# Patient Record
Sex: MALE | Race: WHITE | NOT HISPANIC OR LATINO | Employment: STUDENT | URBAN - METROPOLITAN AREA
[De-identification: names, ages, dates, MRNs, and addresses within clinical notes are randomized per-mention and may not be internally consistent; named-entity substitution may affect disease eponyms.]

---

## 2020-07-31 ENCOUNTER — TRANSCRIBE ORDERS (OUTPATIENT)
Dept: LAB | Facility: HOSPITAL | Age: 20
End: 2020-07-31

## 2020-07-31 ENCOUNTER — APPOINTMENT (OUTPATIENT)
Dept: LAB | Facility: HOSPITAL | Age: 20
End: 2020-07-31
Payer: COMMERCIAL

## 2020-07-31 DIAGNOSIS — R76.8 FALSE POSITIVE SEROLOGICAL TEST FOR SYPHILIS: ICD-10-CM

## 2020-07-31 DIAGNOSIS — R76.8 FALSE POSITIVE SEROLOGICAL TEST FOR SYPHILIS: Primary | ICD-10-CM

## 2020-07-31 LAB — HBV SURFACE AB SER-ACNC: >1000 MIU/ML

## 2020-07-31 PROCEDURE — 36415 COLL VENOUS BLD VENIPUNCTURE: CPT

## 2020-07-31 PROCEDURE — 86706 HEP B SURFACE ANTIBODY: CPT

## 2020-11-17 ENCOUNTER — NURSE TRIAGE (OUTPATIENT)
Dept: OTHER | Facility: OTHER | Age: 20
End: 2020-11-17

## 2020-11-17 DIAGNOSIS — Z20.828 SARS-ASSOCIATED CORONAVIRUS EXPOSURE: Primary | ICD-10-CM

## 2020-11-23 DIAGNOSIS — Z20.828 SARS-ASSOCIATED CORONAVIRUS EXPOSURE: ICD-10-CM

## 2020-11-23 PROCEDURE — U0003 INFECTIOUS AGENT DETECTION BY NUCLEIC ACID (DNA OR RNA); SEVERE ACUTE RESPIRATORY SYNDROME CORONAVIRUS 2 (SARS-COV-2) (CORONAVIRUS DISEASE [COVID-19]), AMPLIFIED PROBE TECHNIQUE, MAKING USE OF HIGH THROUGHPUT TECHNOLOGIES AS DESCRIBED BY CMS-2020-01-R: HCPCS | Performed by: FAMILY MEDICINE

## 2020-11-24 LAB — SARS-COV-2 RNA SPEC QL NAA+PROBE: NOT DETECTED

## 2021-01-03 ENCOUNTER — IMMUNIZATIONS (OUTPATIENT)
Dept: FAMILY MEDICINE CLINIC | Facility: HOSPITAL | Age: 21
End: 2021-01-03

## 2021-01-03 DIAGNOSIS — Z23 ENCOUNTER FOR IMMUNIZATION: ICD-10-CM

## 2021-01-03 PROCEDURE — 0011A SARS-COV-2 / COVID-19 MRNA VACCINE (MODERNA) 100 MCG: CPT

## 2021-01-03 PROCEDURE — 91301 SARS-COV-2 / COVID-19 MRNA VACCINE (MODERNA) 100 MCG: CPT

## 2021-01-30 ENCOUNTER — IMMUNIZATIONS (OUTPATIENT)
Dept: FAMILY MEDICINE CLINIC | Facility: HOSPITAL | Age: 21
End: 2021-01-30

## 2021-01-30 DIAGNOSIS — Z23 ENCOUNTER FOR IMMUNIZATION: Primary | ICD-10-CM

## 2021-01-30 PROCEDURE — 91301 SARS-COV-2 / COVID-19 MRNA VACCINE (MODERNA) 100 MCG: CPT

## 2021-01-30 PROCEDURE — 0012A SARS-COV-2 / COVID-19 MRNA VACCINE (MODERNA) 100 MCG: CPT

## 2021-12-11 ENCOUNTER — IMMUNIZATIONS (OUTPATIENT)
Dept: FAMILY MEDICINE CLINIC | Facility: HOSPITAL | Age: 21
End: 2021-12-11

## 2021-12-11 DIAGNOSIS — Z23 ENCOUNTER FOR IMMUNIZATION: Primary | ICD-10-CM

## 2021-12-11 PROCEDURE — 0001A COVID-19 PFIZER VACC 0.3 ML: CPT

## 2021-12-11 PROCEDURE — 91300 COVID-19 PFIZER VACC 0.3 ML: CPT

## 2023-05-09 ENCOUNTER — TELEPHONE (OUTPATIENT)
Dept: HEMATOLOGY ONCOLOGY | Facility: CLINIC | Age: 23
End: 2023-05-09

## 2023-05-09 NOTE — TELEPHONE ENCOUNTER
Quirino on  requesting patient to fax over most recent labs, imaging, and records regarding his DX  Requested they be faxed to [phone]  Provided him with [phone] if he has questions/concers

## 2023-05-09 NOTE — TELEPHONE ENCOUNTER
Returned call to patient regarding medical records, informed patient to bring whatever records he does have that way we can get them uploaded into his chart  Left my direct teams number for patient if he needs to call back for any questions

## 2023-05-09 NOTE — TELEPHONE ENCOUNTER
Patient Call    Who are you speaking with? Patient    If it is not the patient, are they listed on an active communication consent form? N/A   What is the reason for this call? Pt called because he contacted his pcp from [place] and they cannot fax labs  He has the CT disc and impression and lab results  He would like to know if he can drop it off today or bring it to his appt   Does this require a call back? Yes   If a call back is required, please list best call back number [phone]   If a call back is required, advise that a message will be forwarded to their care team and someone will return their call as soon as possible  Did you relay this information to the patient?  Yes

## 2023-05-11 ENCOUNTER — CONSULT (OUTPATIENT)
Dept: HEMATOLOGY ONCOLOGY | Facility: CLINIC | Age: 23
End: 2023-05-11

## 2023-05-11 ENCOUNTER — APPOINTMENT (OUTPATIENT)
Dept: LAB | Facility: CLINIC | Age: 23
End: 2023-05-11

## 2023-05-11 VITALS
RESPIRATION RATE: 17 BRPM | WEIGHT: 230 LBS | BODY MASS INDEX: 34.86 KG/M2 | HEIGHT: 68 IN | SYSTOLIC BLOOD PRESSURE: 122 MMHG | DIASTOLIC BLOOD PRESSURE: 76 MMHG

## 2023-05-11 DIAGNOSIS — R59.0 LYMPHADENOPATHY, MESENTERIC: ICD-10-CM

## 2023-05-11 DIAGNOSIS — R59.0 LYMPHADENOPATHY, MESENTERIC: Primary | ICD-10-CM

## 2023-05-11 DIAGNOSIS — R07.9 CHEST PAIN, UNSPECIFIED TYPE: ICD-10-CM

## 2023-05-11 DIAGNOSIS — R06.02 SHORTNESS OF BREATH: ICD-10-CM

## 2023-05-11 LAB
ALBUMIN SERPL BCP-MCNC: 4 G/DL (ref 3.5–5)
ALP SERPL-CCNC: 70 U/L (ref 46–116)
ALT SERPL W P-5'-P-CCNC: 26 U/L (ref 12–78)
ANION GAP SERPL CALCULATED.3IONS-SCNC: 0 MMOL/L (ref 4–13)
AST SERPL W P-5'-P-CCNC: 23 U/L (ref 5–45)
BASOPHILS # BLD AUTO: 0.06 THOUSANDS/ÂΜL (ref 0–0.1)
BASOPHILS NFR BLD AUTO: 1 % (ref 0–1)
BILIRUB SERPL-MCNC: 0.63 MG/DL (ref 0.2–1)
BUN SERPL-MCNC: 12 MG/DL (ref 5–25)
CALCIUM SERPL-MCNC: 8.7 MG/DL (ref 8.3–10.1)
CHLORIDE SERPL-SCNC: 109 MMOL/L (ref 96–108)
CO2 SERPL-SCNC: 28 MMOL/L (ref 21–32)
CREAT SERPL-MCNC: 0.92 MG/DL (ref 0.6–1.3)
CRP SERPL QL: 27.1 MG/L
EOSINOPHIL # BLD AUTO: 0.67 THOUSAND/ÂΜL (ref 0–0.61)
EOSINOPHIL NFR BLD AUTO: 10 % (ref 0–6)
ERYTHROCYTE [DISTWIDTH] IN BLOOD BY AUTOMATED COUNT: 14.1 % (ref 11.6–15.1)
GFR SERPL CREATININE-BSD FRML MDRD: 116 ML/MIN/1.73SQ M
GLUCOSE SERPL-MCNC: 89 MG/DL (ref 65–140)
HAV IGM SER QL: NORMAL
HBV CORE IGM SER QL: NORMAL
HBV SURFACE AG SER QL: NORMAL
HCT VFR BLD AUTO: 34.2 % (ref 36.5–49.3)
HCV AB SER QL: NORMAL
HGB BLD-MCNC: 11.8 G/DL (ref 12–17)
HIV 1+2 AB+HIV1 P24 AG SERPL QL IA: NORMAL
HIV 2 AB SERPL QL IA: NORMAL
HIV1 AB SERPL QL IA: NORMAL
HIV1 P24 AG SERPL QL IA: NORMAL
IMM GRANULOCYTES # BLD AUTO: 0.02 THOUSAND/UL (ref 0–0.2)
IMM GRANULOCYTES NFR BLD AUTO: 0 % (ref 0–2)
LDH SERPL-CCNC: 186 U/L (ref 81–234)
LYMPHOCYTES # BLD AUTO: 1.41 THOUSANDS/ÂΜL (ref 0.6–4.47)
LYMPHOCYTES NFR BLD AUTO: 20 % (ref 14–44)
MCH RBC QN AUTO: 28.4 PG (ref 26.8–34.3)
MCHC RBC AUTO-ENTMCNC: 34.5 G/DL (ref 31.4–37.4)
MCV RBC AUTO: 82 FL (ref 82–98)
MONOCYTES # BLD AUTO: 0.46 THOUSAND/ÂΜL (ref 0.17–1.22)
MONOCYTES NFR BLD AUTO: 7 % (ref 4–12)
NEUTROPHILS # BLD AUTO: 4.42 THOUSANDS/ÂΜL (ref 1.85–7.62)
NEUTS SEG NFR BLD AUTO: 62 % (ref 43–75)
NRBC BLD AUTO-RTO: 0 /100 WBCS
PLATELET # BLD AUTO: 315 THOUSANDS/UL (ref 149–390)
PMV BLD AUTO: 10.8 FL (ref 8.9–12.7)
POTASSIUM SERPL-SCNC: 3.4 MMOL/L (ref 3.5–5.3)
PROT SERPL-MCNC: 7.8 G/DL (ref 6.4–8.4)
RBC # BLD AUTO: 4.15 MILLION/UL (ref 3.88–5.62)
SODIUM SERPL-SCNC: 137 MMOL/L (ref 135–147)
WBC # BLD AUTO: 7.04 THOUSAND/UL (ref 4.31–10.16)

## 2023-05-11 NOTE — PROGRESS NOTES
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  On 4/23, he went to [place] to visit family, at some burgers and that night started developing RUQ pain radiating to his lower back  The pain worsened through the night, patient presented to a local hospital       A CT chest/abdomen/pelvis was obtained, which showed mild splenomegaly, small pleural effusion, nonspecific enlarged mesenteric lymph node within the LUQ of the abdomen measuring up to 1 1 cm  Increased number of mesenteric lymph nodes  An abdominal ultrasound was obtained 4/26 due to continued RUQ pain, which showed a slightly contracted gallbladder, no gallbladder abnormality was seen  Patient also experiencing intense mid chest pain a few days ago, which lasts about 5 hours  He is having increased shortness of breath when going up a couple of flights of stairs  Patient denies increased fatigue, fevers, frequent infections, drenching night sweats, unintentional weight loss, decreased appetite  He is continuing to have the RUQ pain  Most recent labs 4/25/23: WBC 6 3, Hgb 12 3, MCV 81, Platelets 336, Creatinine 0 9, eGFR 122, serum iron 112, % saturation 27, ferritin 29 2, Vitamin B12 360  Patient does not smoke cigarettes, denies illicit drug use  Drinks socially  Family history -  maternal grandmother had non-hodgkin's lymphoma and breast cancer  ROS: As stated in history of present illness otherwise her 14 point review of systems today was negative      ECOG PS: 0       Test Results:    Imaging: CT outside images    Result Date: 5/11/2023  Narrative: 1 2 840 [id number] 2 55 3 [id number] 240      Labs: No results found for: WBC, HGB, HCT, MCV, PLT  No results found for: NA, K, CL, CO2, ANIONGAP, BUN, CREATININE, GLUCOSE, GLUF, CALCIUM, CORRECTEDCA, AST, ALT, ALKPHOS, PROT, BILITOT, EGFR    Social History:   Social History     Socioeconomic History   • Marital status: Single     Spouse name: Not on file   • Number of children: Not on file   • Years of education: Not on file   • Highest education level: Not on file   Occupational History   • Not on file   Tobacco Use   • Smoking status: Not on file   • Smokeless tobacco: Not on file   Substance and Sexual Activity   • Alcohol use: Not on file   • Drug use: Not on file   • Sexual activity: Not on file   Other Topics Concern   • Not on file   Social History Narrative   • Not on file     Social Determinants of Health     Financial Resource Strain: Not on file   Food Insecurity: Not on file   Transportation Needs: Not on file   Physical Activity: Not on file   Stress: Not on file   Social Connections: Not on file   Intimate Partner Violence: Not on file   Housing Stability: Not on file       Current Medications:   No current outpatient medications on file  No current facility-administered medications for this visit  Allergies: Allergies   Allergen Reactions   • Augmentin [Amoxicillin-Pot Clavulanate] Rash         Physical Exam:    Body surface area is 2 17 meters squared  Wt Readings from Last 3 Encounters:   05/11/23 104 kg (230 lb)        Temp Readings from Last 3 Encounters:   No data found for Temp        BP Readings from Last 3 Encounters:   05/11/23 122/76         Pulse Readings from Last 3 Encounters:   No data found for Pulse     @LASTSAO2(3)@    Physical Exam     Constitutional   General appearance: No acute distress, well appearing and well nourished  Eyes   Conjunctiva and lids: No swelling, erythema or discharge  Pupils and irises: Equal, round and reactive to light      Ears, Nose, Mouth, and Throat   External inspection of ears and nose: Normal     Nasal mucosa, septum, and turbinates: Normal without edema or erythema  Oropharynx: Normal with no erythema, edema, exudate or lesions  Pulmonary   Respiratory effort: No increased work of breathing or signs of respiratory distress  Auscultation of lungs: Clear to auscultation  Cardiovascular   Palpation of heart: Normal PMI, no thrills  Auscultation of heart: Normal rate and rhythm, normal S1 and S2, without murmurs  Examination of extremities for edema and/or varicosities: Normal     Carotid pulses: Normal     Abdomen   Abdomen: Non-tender, no masses  Liver and spleen: No hepatomegaly or splenomegaly  Lymphatic   Palpation of lymph nodes in neck: No lymphadenopathy  Musculoskeletal   Gait and station: Normal     Digits and nails: Normal without clubbing or cyanosis  Inspection/palpation of joints, bones, and muscles: Normal     Skin   Skin and subcutaneous tissue: Normal without rashes or lesions  Neurologic   Cranial nerves: Cranial nerves 2-12 intact  Sensation: No sensory loss  Psychiatric   Orientation to person, place, and time: Normal     Mood and affect: Normal           Assessment/ Plan:  Reviewed that this may be inflammatory vs viral vs malignancy  We will begin with some blood work CBC, CMP, CRP, EBV, mononucleosis screen, LDH, Hepatitis Panel, HIV Panel, flow cytometry and PET/CT  For patient's chest pain and increased SOB, we will obtain Echo  Recommend patient take oral iron and vitamin b12 supplements  We will see patient back in the office a week after PET/CT  Patient agreeable to plan  He is aware to contact us should he have any further questions/concerns  I spent 60 minutes in chart review, face to face counseling, coordination of care, and documentation

## 2023-05-12 LAB
EBV NA IGG SER IA-ACNC: <18 U/ML (ref 0–17.9)
EBV VCA IGG SER IA-ACNC: <18 U/ML (ref 0–17.9)
EBV VCA IGM SER IA-ACNC: <36 U/ML (ref 0–35.9)
HETEROPH AB SER QL: NEGATIVE
INTERPRETATION: NORMAL
SCAN RESULT: NORMAL

## 2023-05-18 ENCOUNTER — HOSPITAL ENCOUNTER (OUTPATIENT)
Dept: NON INVASIVE DIAGNOSTICS | Facility: CLINIC | Age: 23
Discharge: HOME | End: 2023-05-18

## 2023-05-18 VITALS
HEIGHT: 68 IN | DIASTOLIC BLOOD PRESSURE: 76 MMHG | HEART RATE: 90 BPM | WEIGHT: 230 LBS | BODY MASS INDEX: 34.86 KG/M2 | SYSTOLIC BLOOD PRESSURE: 122 MMHG

## 2023-05-18 DIAGNOSIS — R06.02 SHORTNESS OF BREATH: ICD-10-CM

## 2023-05-18 DIAGNOSIS — R07.9 CHEST PAIN, UNSPECIFIED TYPE: ICD-10-CM

## 2023-05-18 LAB
AORTIC ROOT: 2.7 CM
APICAL FOUR CHAMBER EJECTION FRACTION: 59 %
E WAVE DECELERATION TIME: 153 MS
FRACTIONAL SHORTENING: 33 % (ref 28–44)
INTERVENTRICULAR SEPTUM IN DIASTOLE (PARASTERNAL SHORT AXIS VIEW): 0.9 CM
INTERVENTRICULAR SEPTUM: 0.9 CM (ref 0.6–1.1)
LAAS-AP2: 13.9 CM2
LEFT ATRIUM AREA SYSTOLE SINGLE PLANE A4C: 14.8 CM2
LEFT ATRIUM SIZE: 3.6 CM
LEFT INTERNAL DIMENSION IN SYSTOLE: 3.1 CM (ref 2.1–4)
LEFT VENTRICULAR INTERNAL DIMENSION IN DIASTOLE: 4.6 CM (ref 3.5–6)
LEFT VENTRICULAR POSTERIOR WALL IN END DIASTOLE: 0.9 CM
LEFT VENTRICULAR STROKE VOLUME: 58 ML
LVSV (TEICH): 58 ML
MV E'TISSUE VEL-SEP: 13 CM/S
MV PEAK A VEL: 0.75 M/S
MV PEAK E VEL: 97 CM/S
MV STENOSIS PRESSURE HALF TIME: 44 MS
MV VALVE AREA P 1/2 METHOD: 5 CM2
RIGHT ATRIUM AREA SYSTOLE A4C: 12.2 CM2
RIGHT VENTRICLE ID DIMENSION: 3.8 CM
SL CV LEFT ATRIUM LENGTH A2C: 4.7 CM
SL CV LV EF: 60
SL CV PED ECHO LEFT VENTRICLE DIASTOLIC VOLUME (MOD BIPLANE) 2D: 97 ML
SL CV PED ECHO LEFT VENTRICLE SYSTOLIC VOLUME (MOD BIPLANE) 2D: 39 ML
TRICUSPID ANNULAR PLANE SYSTOLIC EXCURSION: 2 CM

## 2023-05-25 ENCOUNTER — HOSPITAL ENCOUNTER (OUTPATIENT)
Dept: RADIOLOGY | Age: 23
Discharge: HOME | End: 2023-05-25

## 2023-05-25 DIAGNOSIS — R59.0 LYMPHADENOPATHY, MESENTERIC: ICD-10-CM

## 2023-05-25 LAB — GLUCOSE SERPL-MCNC: 93 MG/DL (ref 65–140)

## 2023-05-30 ENCOUNTER — TELEPHONE (OUTPATIENT)
Dept: HEMATOLOGY ONCOLOGY | Facility: CLINIC | Age: 23
End: 2023-05-30

## 2023-05-30 DIAGNOSIS — J90 PLEURAL EFFUSION: Primary | ICD-10-CM

## 2023-05-30 NOTE — TELEPHONE ENCOUNTER
Informed patient his PET/CT revealed the enlarged mesenteric LN likely inflammatory  There was mild uptake  There was also a moderate right pleural effusion, informed patient I will put in a referral to Pulmonology to further evaluate  I will also move his appt from Dr Kwan Mensah schedule to my schedule on 6/6  Patient agreeable

## 2023-06-06 ENCOUNTER — OFFICE VISIT (OUTPATIENT)
Dept: HEMATOLOGY ONCOLOGY | Facility: CLINIC | Age: 23
End: 2023-06-06
Payer: COMMERCIAL

## 2023-06-06 VITALS
TEMPERATURE: 97.8 F | WEIGHT: 229 LBS | OXYGEN SATURATION: 97 % | RESPIRATION RATE: 16 BRPM | SYSTOLIC BLOOD PRESSURE: 120 MMHG | HEART RATE: 85 BPM | HEIGHT: 68 IN | BODY MASS INDEX: 34.71 KG/M2 | DIASTOLIC BLOOD PRESSURE: 74 MMHG

## 2023-06-06 DIAGNOSIS — R59.0 LYMPHADENOPATHY, MESENTERIC: ICD-10-CM

## 2023-06-06 DIAGNOSIS — D64.9 ANEMIA, UNSPECIFIED TYPE: Primary | ICD-10-CM

## 2023-06-06 PROCEDURE — 99214 OFFICE O/P EST MOD 30 MIN: CPT

## 2023-06-06 NOTE — PROGRESS NOTES
HEMATOLOGY / [address] FOLLOW UP NOTE    Primary Care Provider: Tate Wood  Referring Provider:    MRN: [id number]  : 2000    Reason for Encounter:  Mesenteric Lymphadenopathy         Interval History:  Patient presents for a follow up of his mesenteric lymphadenopathy  Patient experiencing pain right side back pain  He denies SOB  Denies fevers, drenching nights sweats, decreased appetite  Denies abdominal pain, diarrhea, constipation  Labs: , CRP 27 1, EBV panel negative, Hepatitis Panel negative, HIV negative, Hgb 11 8, MCV 82, WBC 7 04, Platelets 354014  PET/CT 23:  1  Mesenteric nodes do not demonstrate significant FDG activity, and may just be reactive  Consider CT follow-up in 6 months to demonstrate stability  2  Minimal improvement in right middle infiltrate/atelectasis  Increased size of moderate right pleural effusion  This may be inflammatory  Correlate clinically  Follow-up to resolution recommended  3  Stable mild splenomegaly but without significant FDG uptake  This may also be reassessed on follow-up CT  4  Otherwise there are no hypermetabolic lesions that are concerning for malignancy/metastases  Patient was informed of the CT scan results  and referral for pulmonology was placed  Advised at that time to follow up with them regarding the pleural effusion  HPI: HPI: Manish Anderson was seen for initial consultation 2023 regarding mesenteric lymphadenopathy  Patient has no significant PMH  He has ALBERTINA and uses a CPAP, which he reports he didn't clean that often  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  On , he went to [place] to visit family[facility] and [facility] started developing RUQ pain radiating to his lower back   The pain worsened through the night, patient presented to a local hospital      A CT chest/abdomen/pelvis was obtained, which showed mild splenomegaly, small pleural effusion, nonspecific enlarged mesenteric lymph node within the LUQ of the abdomen measuring up to 1 1 cm  Increased number of mesenteric lymph nodes  An abdominal ultrasound was obtained 4/26 due to continued RUQ pain, which showed a slightly contracted gallbladder, no gallbladder abnormality was seen  Patient also experiencing intense mid chest pain a few days ago, which lasts about 5 hours  He is having increased shortness of breath when going up a couple of flights of stairs  Patient denies increased fatigue, fevers, frequent infections, drenching night sweats, unintentional weight loss, decreased appetite  He is continuing to have the RUQ pain  REVIEW OF SYSTEMS:  Please note that a 14-point review of systems was performed to include Constitutional, HEENT, Respiratory, CVS, GI, , Musculoskeletal, Integumentary, Neurologic, Rheumatologic, Endocrinologic, Psychiatric, Lymphatic, and Hematologic/Oncologic systems were reviewed and are negative unless otherwise stated in HPI  Positive and negative findings pertinent to this evaluation are incorporated into the history of present illness  ECOG PS: 0    PROBLEM LIST:  There is no problem list on file for this patient  Assessment / Plan:  Patient has not followed up with Pulmonology, strongly encouraged patient to follow up as his back pain likely related to pleural effusion  We will repeat CT scan chest abdomen pelvis w contrast in 6 months  Will repeat CBCD, CMP prior to scan  We will see patient back in 6 months  He is encouraged to call the office with worsening symptoms or if he has any additional questions/concerns  I spent 30 minutes on chart review, face to face counseling time, coordination of care and documentation  Past Medical History:   has no past medical history on file  PAST SURGICAL HISTORY:   has no past surgical history on file  CURRENT MEDICATIONS  No current outpatient medications on file       No current facility-administered medications for this "visit  [unfilled]    SOCIAL HISTORY:   reports that he has never smoked  He has never been exposed to tobacco smoke  He has never used smokeless tobacco  He reports current alcohol use of about 1 0 standard drink of alcohol per week  He reports that he does not use drugs  FAMILY HISTORY:  family history includes Breast cancer (age of onset: 79) in his maternal grandmother; Lymphoma (age of onset: 79) in his maternal grandmother  ALLERGIES:  is allergic to augmentin [amoxicillin-pot clavulanate]  Physical Exam:  Vital Signs:   Visit Vitals  /74 (BP Location: Left arm, Patient Position: Sitting, Cuff Size: Standard)   Pulse 85   Temp 97 8 °F (36 6 °C) (Temporal)   Resp 16   Ht 5' 8\" (1 727 m)   Wt 104 kg (229 lb)   SpO2 97%   BMI 34 82 kg/m²   Smoking Status Never   BSA 2 17 m²     Body mass index is 34 82 kg/m²  Body surface area is 2 17 meters squared  GEN: Alert, awake oriented x3, in no acute distress  HEENT- No pallor, icterus, cyanosis, no oral mucosal lesions,   LAD - no palpable cervical, clavicle, axillary, inguinal LAD  Heart- normal S1 S2, regular rate and rhythm, No murmur, rubs     Lungs- clear breathing sound bilateral    Abdomen- soft, Non tender, bowel sounds present  Extremities- No cyanosis, clubbing, edema  Neuro- No focal neurological deficit    Labs:  Lab Results   Component Value Date    HCT 34 2 (L) 05/11/2023    HGB 11 8 (L) 05/11/2023    MCV 82 05/11/2023     05/11/2023    WBC 7 04 05/11/2023     Lab Results   Component Value Date    AGAP 0 (L) 05/11/2023    ALKPHOS 70 05/11/2023    ALT 26 05/11/2023    AST 23 05/11/2023    BUN 12 05/11/2023    CALCIUM 8 7 05/11/2023     (H) 05/11/2023    CO2 28 05/11/2023    CREATININE 0 92 05/11/2023    EGFR 116 05/11/2023    GLUC 89 05/11/2023    K 3 4 (L) 05/11/2023    SODIUM 137 05/11/2023    TBILI 0 63 05/11/2023    TP 7 8 05/11/2023     "

## 2023-06-08 ENCOUNTER — TELEPHONE (OUTPATIENT)
Dept: PULMONOLOGY | Facility: CLINIC | Age: 23
End: 2023-06-08

## 2023-06-08 NOTE — TELEPHONE ENCOUNTER
Returned patient's call and left message in regards to scheduling a consult appointment, per referral placed by Hematologist, at our [place] office

## 2023-06-12 ENCOUNTER — CONSULT (OUTPATIENT)
Dept: PULMONOLOGY | Facility: CLINIC | Age: 23
End: 2023-06-12
Payer: COMMERCIAL

## 2023-06-12 ENCOUNTER — HOSPITAL ENCOUNTER (OUTPATIENT)
Dept: RADIOLOGY | Facility: HOSPITAL | Age: 23
Discharge: HOME | End: 2023-06-12
Attending: INTERNAL MEDICINE
Payer: COMMERCIAL

## 2023-06-12 ENCOUNTER — PREP FOR PROCEDURE (OUTPATIENT)
Dept: PULMONOLOGY | Facility: CLINIC | Age: 23
End: 2023-06-12

## 2023-06-12 ENCOUNTER — APPOINTMENT (OUTPATIENT)
Dept: LAB | Facility: CLINIC | Age: 23
End: 2023-06-12
Payer: COMMERCIAL

## 2023-06-12 VITALS
HEART RATE: 98 BPM | TEMPERATURE: 99 F | HEIGHT: 68 IN | SYSTOLIC BLOOD PRESSURE: 116 MMHG | WEIGHT: 237.9 LBS | BODY MASS INDEX: 36.06 KG/M2 | OXYGEN SATURATION: 98 % | DIASTOLIC BLOOD PRESSURE: 78 MMHG

## 2023-06-12 DIAGNOSIS — R16.1 SPLENOMEGALY: ICD-10-CM

## 2023-06-12 DIAGNOSIS — J90 PLEURAL EFFUSION: ICD-10-CM

## 2023-06-12 DIAGNOSIS — D64.9 ANEMIA, UNSPECIFIED TYPE: ICD-10-CM

## 2023-06-12 DIAGNOSIS — J90 PLEURAL EFFUSION: Primary | ICD-10-CM

## 2023-06-12 DIAGNOSIS — G47.33 OBSTRUCTIVE SLEEP APNEA ON CPAP: ICD-10-CM

## 2023-06-12 DIAGNOSIS — Z99.89 OBSTRUCTIVE SLEEP APNEA ON CPAP: ICD-10-CM

## 2023-06-12 DIAGNOSIS — J18.9 ATYPICAL PNEUMONIA: ICD-10-CM

## 2023-06-12 LAB
ANA SER QL IA: NEGATIVE
ERYTHROCYTE [SEDIMENTATION RATE] IN BLOOD: 25 MM/HOUR (ref 0–14)
FERRITIN SERPL-MCNC: 19 NG/ML (ref 24–336)

## 2023-06-12 PROCEDURE — 83540 ASSAY OF IRON: CPT

## 2023-06-12 PROCEDURE — 86430 RHEUMATOID FACTOR TEST QUAL: CPT

## 2023-06-12 PROCEDURE — 86140 C-REACTIVE PROTEIN: CPT

## 2023-06-12 PROCEDURE — 85652 RBC SED RATE AUTOMATED: CPT

## 2023-06-12 PROCEDURE — 99204 OFFICE O/P NEW MOD 45 MIN: CPT | Performed by: INTERNAL MEDICINE

## 2023-06-12 PROCEDURE — 36415 COLL VENOUS BLD VENIPUNCTURE: CPT

## 2023-06-12 PROCEDURE — 83550 IRON BINDING TEST: CPT

## 2023-06-12 PROCEDURE — 86038 ANTINUCLEAR ANTIBODIES: CPT

## 2023-06-12 PROCEDURE — 82728 ASSAY OF FERRITIN: CPT

## 2023-06-12 PROCEDURE — 71046 X-RAY EXAM CHEST 2 VIEWS: CPT

## 2023-06-12 NOTE — ASSESSMENT & PLAN NOTE
Continue CPAP nightly    Advised him to try nasal pillows in the future with chinstrap and he will discuss with his sleep specialist

## 2023-06-12 NOTE — ASSESSMENT & PLAN NOTE
Mild with tendency to microcytosis/hypochromia although still within the normal range  It is unexpected to have anemia given his young age  I will send iron studies for now

## 2023-06-12 NOTE — ASSESSMENT & PLAN NOTE
Treated with cefepime and cefuroxime, will continue to follow imaging studies in the future after thoracentesis

## 2023-06-12 NOTE — ASSESSMENT & PLAN NOTE
Follow with hematology specially with the mesenteric lymph nodes  No clear etiology  We will check flow cytometry on the pleural fluid

## 2023-06-12 NOTE — PROGRESS NOTES
Consultation - Pulmonary Medicine   Viola Mosquera 23 y o  male MRN: [id number]    Physician Requesting Consult: Janie Werner  Reason for Consult: PLEURAL EFFUSION    Pleural effusion  Right-sided increased in size from March to May, no clear etiology, not sure if there is an infectious process in March that evolved and currently has residual effusion but this is not typical at all  I explained to the Lina Culver that I do not have a diagnosis yet but will need to do thoracentesis therapeutic and diagnostic to allow his lung to expand and to get some work-up with a regular chemistry and cell count with differential and cytology, also will check cultures including AFB and fungal and I will send sample for flow cytometry given the splenomegaly and the mesenteric lymph nodes  We will send patient for chest x-ray before the procedure  Otherwise we will continue to monitor in the future  We will repeat inflammatory markers and basic autoimmune work-up  Obstructive sleep apnea on CPAP  Continue CPAP nightly  Advised him to try nasal pillows in the future with chinstrap and he will discuss with his sleep specialist     Atypical pneumonia  Treated with cefepime and cefuroxime, will continue to follow imaging studies in the future after thoracentesis    Splenomegaly  Follow with hematology specially with the mesenteric lymph nodes  No clear etiology  We will check flow cytometry on the pleural fluid  Anemia  Mild with tendency to microcytosis/hypochromia although still within the normal range  It is unexpected to have anemia given his young age  I will send iron studies for now  Diagnoses and all orders for this visit:    Pleural effusion  -     Ambulatory Referral to Pulmonology  -     XR chest pa & lateral; Future  -     Sedimentation rate, automated; Future  -     C-reactive protein; Future  -     MARCELL Screen w/ Reflex to Titer/Pattern;  Future  -     RF Screen w/ Reflex to Titer; Future    Obstructive sleep apnea on CPAP    Atypical pneumonia    Splenomegaly    Anemia, unspecified type  -     Iron Panel (Includes Ferritin, Iron Sat%, Iron, and TIBC); Future        Patient will have thoracentesis done tomorrow by the pulmonary team at [address] [place] [address]  ______________________________________________________________________    HPI:    Gal Katz is a 23 y o  male who presents for evaluation of pleural effusion  Patient has no significant past medical history although he mentions chronic anemia that he attributes to iron deficiency without work-up  He developed right-sided anterior/parasternal chest pain in February of this year 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, he did not do any work-up or follow-up at that time, eventually the chest pain resolved, he did not have fever or chills or night sweats or shortness of breath at that time  Then in March while he was traveling to [place] he developed severe right-sided lateral/posterior chest pain also did not have significant other symptoms, and due to the severity of the pain he went to the emergency room and had a CT scan that showed small pleural effusion  He had recent follow-up with hematology and had PET scan that showed splenomegaly as well  Patient denies history of autoimmune disease or malignancy  No family history of autoimmune disease except of Hashimoto  He denies weight loss or fever or chills or night sweats or pruritus  Denies other complaints  Currently denies chest pain but still feels some rattling noise in his chest   No significant travel history recently except of camping in [place] when he had a tick bite but that was after the first incident of the chest pain  Patient has obstructive sleep apnea and he is on CPAP 5 cm H2O via facial mask  He follows with a sleep specialist in [place]    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 [employer], never smoked cigarettes, no other recreational "drugs  He had Arsalan Sacks for medical school before and it was negative  Review of Systems:  Review of Systems   Constitutional: Negative  HENT: Negative  Eyes: Negative  Respiratory: Negative  Cardiovascular: Negative  Gastrointestinal: Negative  Endocrine: Negative  Genitourinary: Negative  Musculoskeletal: Negative  Skin: Negative  Allergic/Immunologic: Negative  Neurological: Negative  Hematological: Negative  Psychiatric/Behavioral: Negative  Aside from what is mentioned in the HPI, the review of systems otherwise negative  Current Medications:  No current outpatient medications on file  Historical Information   Past Medical History:   Diagnosis Date   • Anemia 23   • Pleural effusion 23   • Sleep apnea, obstructive 2020    uses Cpap-[employer]     History reviewed  No pertinent surgical history    Social History   Social History     Tobacco Use   Smoking Status Never   • Passive exposure: Never   Smokeless Tobacco Never       Occupational history:  No occupational exposure    Family History:   Family History   Problem Relation Age of Onset   • Lymphoma Maternal Grandmother 79        non hodgkins   • Breast cancer Maternal Grandmother 79   • Cancer Maternal Grandmother         Alive, non-Hodgkin's lymphoma, breast cancer   • Diabetes Maternal Grandmother         Alive   • Diabetes Mother         Alive   • Diabetes Father         Alive   • Diabetes Maternal Grandfather                  PhysicalExamination:  Vitals:   /78 (BP Location: Left arm, Patient Position: Sitting, Cuff Size: Large)   Pulse 98   Temp 99 °F (37 2 °C)   Ht 5' 8\" (1 727 m)   Wt 108 kg (237 lb 14 4 oz)   SpO2 98%   BMI 36 17 kg/m²     General: alert, not in acute distress  HEENT: PERRL, no icteric sclera or cyanosis, no thrush, Mallampati 4  Neck: Supple, no lymphadenopathy or thyromegaly, no JVD  Lungs: Decreased breath sounds at the right base, no wheezing " "or crackles  Heart: S1S2 regular, no murmurs or gallops  Abdomen: soft, nontender, bowel sounds present  Extremities: no edema, no clubbing or cyanosis  Neuro: Alert and oriented x 3, no focal neurodeficits   Skin: intact, no rashes      Diagnostic Data:  Labs: I personally reviewed the most recent laboratory data pertinent to today's visit    Lab Results   Component Value Date    HCT 34 2 (L) 05/11/2023    HGB 11 8 (L) 05/11/2023    MCV 82 05/11/2023     05/11/2023    WBC 7 04 05/11/2023     Lab Results   Component Value Date    BUN 12 05/11/2023    CALCIUM 8 7 05/11/2023     (H) 05/11/2023    CO2 28 05/11/2023    CREATININE 0 92 05/11/2023    K 3 4 (L) 05/11/2023     No results found for: \"IGE\"  Lab Results   Component Value Date    ALKPHOS 70 05/11/2023    ALT 26 05/11/2023    AST 23 05/11/2023         Imaging:  I personally reviewed the images on the [facility] system pertinent to today's visit  Chest CT scan from March 2023 reviewed on PACS: In general normal lung parenchyma and no mediastinal lymphadenopathy but there is small area of atelectasis and anterior small pleural-based consolidation in the right middle lobe  Very small right-sided pleural effusion        PET scan from May 2023 reviewed on PACS with the patient in the room: No abnormal hypermetabolic activity, increased and now moderate size right-sided pleural effusion, splenomegaly or at least at the upper size of normal     Other studies:  Cardiogram: LVEF 60%, normal RV      Shmuel Watkins MD  "

## 2023-06-12 NOTE — ASSESSMENT & PLAN NOTE
Right-sided increased in size from March to May, no clear etiology, not sure if there is an infectious process in March that evolved and currently has residual effusion but this is not typical at all  I explained to the Zunilda Delvalle that I do not have a diagnosis yet but will need to do thoracentesis therapeutic and diagnostic to allow his lung to expand and to get some work-up with a regular chemistry and cell count with differential and cytology, also will check cultures including AFB and fungal and I will send sample for flow cytometry given the splenomegaly and the mesenteric lymph nodes  We will send patient for chest x-ray before the procedure  Otherwise we will continue to monitor in the future  We will repeat inflammatory markers and basic autoimmune work-up

## 2023-06-13 ENCOUNTER — HOSPITAL ENCOUNTER (OUTPATIENT)
Dept: RADIOLOGY | Facility: HOSPITAL | Age: 23
Discharge: HOME | End: 2023-06-13
Attending: INTERNAL MEDICINE
Payer: COMMERCIAL

## 2023-06-13 ENCOUNTER — HOSPITAL ENCOUNTER (OUTPATIENT)
Dept: SURGERY | Facility: HOSPITAL | Age: 23
Discharge: HOME | End: 2023-06-13
Attending: INTERNAL MEDICINE | Admitting: INTERNAL MEDICINE
Payer: COMMERCIAL

## 2023-06-13 VITALS
OXYGEN SATURATION: 97 % | TEMPERATURE: 98.5 F | SYSTOLIC BLOOD PRESSURE: 133 MMHG | HEART RATE: 92 BPM | RESPIRATION RATE: 18 BRPM | DIASTOLIC BLOOD PRESSURE: 61 MMHG

## 2023-06-13 VITALS
DIASTOLIC BLOOD PRESSURE: 72 MMHG | HEART RATE: 82 BPM | RESPIRATION RATE: 18 BRPM | OXYGEN SATURATION: 97 % | SYSTOLIC BLOOD PRESSURE: 118 MMHG

## 2023-06-13 DIAGNOSIS — J90 PLEURAL EFFUSION: Primary | ICD-10-CM

## 2023-06-13 DIAGNOSIS — J90 PLEURAL EFFUSION: ICD-10-CM

## 2023-06-13 LAB
BASOPHILS NFR FLD MANUAL: 1 %
CRP SERPL QL: 13 MG/L
EOSINOPHIL NFR FLD MANUAL: 20 %
GLUCOSE FLD-MCNC: 96 MG/DL
IRON SATN MFR SERPL: 8 % (ref 20–50)
IRON SERPL-MCNC: 30 UG/DL (ref 65–175)
LDH FLD L TO P-CCNC: 471 U/L
LYMPHOCYTES NFR BLD AUTO: 55 %
MONOCYTES NFR BLD AUTO: 11 %
NEUTS SEG NFR BLD AUTO: 13 %
PH BODY FLUID: 7.5
PROT FLD-MCNC: 5 G/DL
RHEUMATOID FACT SER QL LA: NEGATIVE
TIBC SERPL-MCNC: 371 UG/DL (ref 250–450)
TOTAL CELLS COUNTED SPEC: 100
WBC # FLD MANUAL: 1955 /UL

## 2023-06-13 PROCEDURE — 87102 FUNGUS ISOLATION CULTURE: CPT | Performed by: INTERNAL MEDICINE

## 2023-06-13 PROCEDURE — 88185 FLOWCYTOMETRY/TC ADD-ON: CPT

## 2023-06-13 PROCEDURE — 82945 GLUCOSE OTHER FLUID: CPT | Performed by: INTERNAL MEDICINE

## 2023-06-13 PROCEDURE — 87205 SMEAR GRAM STAIN: CPT | Performed by: INTERNAL MEDICINE

## 2023-06-13 PROCEDURE — 84157 ASSAY OF PROTEIN OTHER: CPT | Performed by: INTERNAL MEDICINE

## 2023-06-13 PROCEDURE — 88112 CYTOPATH CELL ENHANCE TECH: CPT | Performed by: PATHOLOGY

## 2023-06-13 PROCEDURE — 87070 CULTURE OTHR SPECIMN AEROBIC: CPT | Performed by: INTERNAL MEDICINE

## 2023-06-13 PROCEDURE — 32555 ASPIRATE PLEURA W/ IMAGING: CPT | Performed by: RADIOLOGY

## 2023-06-13 PROCEDURE — 83986 ASSAY PH BODY FLUID NOS: CPT | Performed by: INTERNAL MEDICINE

## 2023-06-13 PROCEDURE — 88305 TISSUE EXAM BY PATHOLOGIST: CPT | Performed by: PATHOLOGY

## 2023-06-13 PROCEDURE — 83615 LACTATE (LD) (LDH) ENZYME: CPT | Performed by: INTERNAL MEDICINE

## 2023-06-13 PROCEDURE — 32555 ASPIRATE PLEURA W/ IMAGING: CPT

## 2023-06-13 PROCEDURE — 87116 MYCOBACTERIA CULTURE: CPT | Performed by: INTERNAL MEDICINE

## 2023-06-13 PROCEDURE — 88184 FLOWCYTOMETRY/ TC 1 MARKER: CPT | Performed by: INTERNAL MEDICINE

## 2023-06-13 PROCEDURE — 89051 BODY FLUID CELL COUNT: CPT | Performed by: INTERNAL MEDICINE

## 2023-06-13 PROCEDURE — 87206 SMEAR FLUORESCENT/ACID STAI: CPT | Performed by: INTERNAL MEDICINE

## 2023-06-13 NOTE — H&P
Pulmonary Consultation   Zay So 23 y o  male MRN: [id number]  2023      Reason for Consultation:  Pleural effusion    Referring: Nikita Sung Md  [facility] [address], [address]:    Pleural effusion    For thoracentesis  US first to ensure adequate window for drainage  UPDATE  Window not amenable for procedure  History of Present Illness   HPI:  Zay So is a 23 y o  male who has a past medical history of ALBERTINA and BEN presents to B for pleural effusion  Seen in clinic yesterday  Referred for thoracentesis    No complaints  Review of Systems   Constitutional: Negative for chills and fever  HENT: Negative for ear pain and sore throat  Eyes: Negative for pain and visual disturbance  Respiratory: Negative for cough and shortness of breath  Cardiovascular: Negative for chest pain and palpitations  Gastrointestinal: Negative for abdominal pain and vomiting  Genitourinary: Negative for dysuria and hematuria  Musculoskeletal: Negative for arthralgias and back pain  Skin: Negative for color change and rash  Neurological: Negative for seizures and syncope  All other systems reviewed and are negative  Historical Information   Past Medical History:   Diagnosis Date   • Anemia 23   • Pleural effusion 23   • Sleep apnea, obstructive 2020    uses [facility]ap[facility]     No past surgical history on file    Family History   Problem Relation Age of Onset   • Lymphoma Maternal Grandmother 79        non hodgkins   • Breast cancer Maternal Grandmother 79   • Cancer Maternal Grandmother         Alive, non-Hodgkin's lymphoma, breast cancer   • Diabetes Maternal Grandmother         Alive   • Diabetes Mother         Alive   • Diabetes Father         Alive   • Diabetes Maternal Grandfather                Occupational History: student    Social History:   Social History     Socioeconomic History   • Marital status: Single Spouse name: Not on file   • Number of children: Not on file   • Years of education: Not on file   • Highest education level: Not on file   Occupational History   • Not on file   Tobacco Use   • Smoking status: Never     Passive exposure: Never   • Smokeless tobacco: Never   Substance and Sexual Activity   • Alcohol use: Yes     Comment: Social use (3-5 drinks/month)   • Drug use: Never   • Sexual activity: Yes     Partners: Female     Birth control/protection: Implant   Other Topics Concern   • Not on file   Social History Narrative   • Not on file     Social Determinants of Health     Financial Resource Strain: Not on file   Food Insecurity: Not on file   Transportation Needs: Not on file   Physical Activity: Not on file   Stress: Not on file   Social Connections: Not on file   Intimate Partner Violence: Not on file   Housing Stability: Not on file        Meds/Allergies   No current outpatient medications on file  Allergies   Allergen Reactions   • Augmentin [Amoxicillin-Pot Clavulanate] Rash       Vitals: There were no vitals taken for this visit  , There is no height or weight on file to calculate BMI  Physical Exam  Physical Exam  Vitals and nursing note reviewed  Constitutional:       General: He is not in acute distress  Appearance: Normal appearance  He is well-developed  He is obese  He is not ill-appearing or toxic-appearing  Interventions: He is not intubated  HENT:      Head: Normocephalic and atraumatic  Right Ear: External ear normal       Left Ear: External ear normal       Nose: Nose normal       Mouth/Throat:      Mouth: Mucous membranes are moist       Pharynx: Oropharynx is clear  Eyes:      General: No scleral icterus  Conjunctiva/sclera: Conjunctivae normal    Cardiovascular:      Rate and Rhythm: Normal rate and regular rhythm  Pulses: Normal pulses  Heart sounds: Normal heart sounds  No murmur heard  No friction rub  No gallop     Pulmonary: "Effort: Pulmonary effort is normal  No tachypnea, accessory muscle usage or respiratory distress  He is not intubated  Breath sounds: Normal air entry  No decreased air movement  Examination of the right-middle field reveals decreased breath sounds  Examination of the right-lower field reveals decreased breath sounds  Decreased breath sounds present  No wheezing, rhonchi or rales  Abdominal:      General: Abdomen is flat  Bowel sounds are normal       Palpations: Abdomen is soft  Musculoskeletal:         General: No swelling or tenderness  Cervical back: Neck supple  No tenderness  Skin:     General: Skin is warm and dry  Neurological:      General: No focal deficit present  Mental Status: He is alert and oriented to person, place, and time  Mental status is at baseline  Labs: I have personally reviewed pertinent lab results  Lab Results   Component Value Date    HCT 34 2 (L) 05/11/2023    HGB 11 8 (L) 05/11/2023    MCV 82 05/11/2023     05/11/2023    WBC 7 04 05/11/2023     Lab Results   Component Value Date    BUN 12 05/11/2023    CALCIUM 8 7 05/11/2023     (H) 05/11/2023    CO2 28 05/11/2023    CREATININE 0 92 05/11/2023    K 3 4 (L) 05/11/2023     No results found for: \"IGE\"  Lab Results   Component Value Date    ALKPHOS 70 05/11/2023    ALT 26 05/11/2023    AST 23 05/11/2023         Imaging and other studies: I have personally reviewed pertinent reports  and I have personally reviewed pertinent films in PACS  PET CT 5/25  CHEST:  Minimal improvement in right middle infiltrate/atelectasis  Increased moderate right pleural effusion with minimal FDG activity, SUV 1 7  This may be inflammatory  Associated right lower atelectasis      No hypermetabolic hilar or mediastinal adenopathy  Pulmonary function testing: none    EKG, Pathology, and Other Studies: I have personally reviewed pertinent reports  Hayley Elena MS  Pulmonary & Critical Care " Medicine Fellow, PGY-V  [address]

## 2023-06-13 NOTE — SEDATION DOCUMENTATION
Right side thoracentesis performed by Dr Kristine Corey  400 ml of clear yellow fluid drained  Ultrasound guided  Labs ordered  Pt tolerated well  Puncture site is CDI and covered with a band aid  AVS reviewed with pt upon dc

## 2023-06-13 NOTE — DISCHARGE INSTRUCTIONS
Thoracentesis   WHAT YOU NEED TO KNOW:   A thoracentesis is a procedure to remove extra fluid or air from between your lungs and your inner chest wall  Air or fluid buildup may make it hard for you to breathe  A thoracentesis allows your lungs to expand fully so you can breathe more easily  DISCHARGE INSTRUCTIONS:     Small amount of shoulder pain and bloody sputum is normal after a Thoracentesis  Rest:  Rest when you feel it is needed  Slowly start to do more each day  Return to your daily activities as directed  Resume your normal diet  Small sips of flat soda will help mild nausea  Do not smoke: If you smoke, it is never too late to quit  Ask for information about how to stop smoking if you need help  Contact Interventional Radiology at [phone] [place] PATIENTS: Contact Interventional Radiology at [phone]) Bill Ospina PATIENTS: Contact Interventional Radiology at [phone]) if:   You have a fever  Your puncture site is red, warm, swollen, or draining pus  You have questions or concerns about your procedure, medicine, or care  Seek care immediately or call 911 if:   Severe chest pain with inspiration and shortness of breath    Large amounts of blood in your sputum    Follow up with your healthcare provider as directed

## 2023-06-13 NOTE — H&P
History and Physical     Name: Kandis Lomeli   Age & Sex: 23 y o  male   MRN: [id number]  Unit/Bed#:    Encounter: [id number]            Assessment:  Right pleural effusion  Recent pneumonia  Splenomegaly with lymphadenopathy    Recommendations:  Plan was to proceed with R thoracentesis, however, after ultrasound evaluation the window for thoracentesis was small  Only one rib space with adequate fluid and there was lung point visible with atelectatic lung  I am requesting this procedure be done by IR with real-time ultrasound guidance for needle entry  Discussed with IR attending Dr Philly Vee  Order placed  Discussed with patient  Diagnostic labs ordered  History of Present Illness   HPI:  Kandis Lomeli is a 23 y o  male with PMHx R pleural effusion, splenomegaly, recent atypical pneumonia, ABLERTINA who presents for R sided thoracentesis  He has no complaints currently    Review of systems:  12 point review of systems was completed and was otherwise negative except as listed in HPI  Historical Information   Past Medical History:   Diagnosis Date   • Anemia 23   • Pleural effusion 23   • Sleep apnea, obstructive 2020    uses [facility]     History reviewed  No pertinent surgical history  Family History   Problem Relation Age of Onset   • Lymphoma Maternal Grandmother 79        non hodgkins   • Breast cancer Maternal Grandmother 79   • Cancer Maternal Grandmother         Alive, non-Hodgkin's lymphoma, breast cancer   • Diabetes Maternal Grandmother         Alive   • Diabetes Mother         Alive   • Diabetes Father         Alive   • Diabetes Maternal Grandfather                  Social History:   Social History     Tobacco Use   Smoking Status Never   • Passive exposure: Never   Smokeless Tobacco Never         Meds/Allergies   No current facility-administered medications for this encounter       (Not in a hospital admission)    Allergies   Allergen Reactions   • Augmentin "[Amoxicillin-Pot Clavulanate] Rash       Vitals: Blood pressure 133/61, pulse 92, temperature 98 5 °F (36 9 °C), temperature source Oral, resp  rate 18, SpO2 97 %  , Room air, There is no height or weight on file to calculate BMI  No intake or output data in the 24 hours ending 06/13/23 0955    Physical Exam  Vitals and nursing note reviewed  Constitutional:       General: He is not in acute distress  Appearance: He is well-developed  HENT:      Head: Normocephalic and atraumatic  Eyes:      Conjunctiva/sclera: Conjunctivae normal    Cardiovascular:      Rate and Rhythm: Normal rate and regular rhythm  Heart sounds: No murmur heard  Pulmonary:      Effort: Pulmonary effort is normal  No respiratory distress  Comments: Diminished at R base  Abdominal:      Palpations: Abdomen is soft  Tenderness: There is no abdominal tenderness  Musculoskeletal:         General: No swelling  Cervical back: Neck supple  Skin:     General: Skin is warm and dry  Capillary Refill: Capillary refill takes less than 2 seconds  Neurological:      Mental Status: He is alert  Psychiatric:         Mood and Affect: Mood normal          Labs: I have personally reviewed pertinent lab results  Laboratory and Diagnostics                            Results from last 7 days   Lab Units 06/12/23  1528   CRP mg/L 13 0*   SED RATE mm/hour 25*   FERRITIN ng/mL 19*                       Microbiology:        ABG: No results found for: \"BEART\", \"LTO0XGZ\", \"O6YWOBEO\", \"KIE0LBT\", \"PHART\", \"PO2ART\", \"SOURCE\"      Imaging and other studies: I have personally reviewed pertinent reports  and I have personally reviewed pertinent films in PACS    CXR 6/12- R sided pleural effusion      Pulmonary function testing: None    EKG, Pathology, and Other Studies: I have personally reviewed pertinent reports              Lali Duarte MD  Attending Physician  Pulmonary and Critical Care Medicine    "

## 2023-06-14 LAB
RHODAMINE-AURAMINE STN SPEC: NORMAL
SCAN RESULT: NORMAL

## 2023-06-15 PROCEDURE — 88112 CYTOPATH CELL ENHANCE TECH: CPT | Performed by: PATHOLOGY

## 2023-06-15 PROCEDURE — 88305 TISSUE EXAM BY PATHOLOGIST: CPT | Performed by: PATHOLOGY

## 2023-06-16 LAB
BACTERIA SPEC BFLD CULT: NO GROWTH
GRAM STN SPEC: NORMAL
GRAM STN SPEC: NORMAL

## 2023-06-19 LAB — FUNGUS SPEC CULT: NORMAL

## 2023-06-20 LAB
MYCOBACTERIUM SPEC CULT: NORMAL
RHODAMINE-AURAMINE STN SPEC: NORMAL

## 2023-06-26 LAB — FUNGUS SPEC CULT: NORMAL

## 2023-06-27 LAB
MYCOBACTERIUM SPEC CULT: NORMAL
RHODAMINE-AURAMINE STN SPEC: NORMAL

## 2023-07-03 LAB — FUNGUS SPEC CULT: NORMAL

## 2023-07-05 LAB
MYCOBACTERIUM SPEC CULT: NORMAL
RHODAMINE-AURAMINE STN SPEC: NORMAL

## 2023-07-10 LAB — FUNGUS SPEC CULT: NORMAL

## 2023-07-11 ENCOUNTER — PATIENT MESSAGE (OUTPATIENT)
Dept: PULMONOLOGY | Facility: CLINIC | Age: 23
End: 2023-07-11

## 2023-07-11 DIAGNOSIS — D64.9 ANEMIA, UNSPECIFIED TYPE: Primary | ICD-10-CM

## 2023-07-11 LAB
MYCOBACTERIUM SPEC CULT: NORMAL
RHODAMINE-AURAMINE STN SPEC: NORMAL

## 2023-07-17 LAB — FUNGUS SPEC CULT: NORMAL

## 2023-07-19 DIAGNOSIS — D64.9 ANEMIA, UNSPECIFIED TYPE: Primary | ICD-10-CM

## 2023-07-25 LAB
MYCOBACTERIUM SPEC CULT: NORMAL
RHODAMINE-AURAMINE STN SPEC: NORMAL

## 2023-07-28 ENCOUNTER — TELEPHONE (OUTPATIENT)
Dept: HEMATOLOGY ONCOLOGY | Facility: CLINIC | Age: 23
End: 2023-07-28

## 2023-07-28 NOTE — TELEPHONE ENCOUNTER
I called [facility] regarding an appointment that they have scheduled with LEONARDO Patton scheduled on 12/14/23     The line was answered but there was no response. This appointment would need to be rescheduled. A [employer] message (if applicable) has been sent to patient relaying the above information and advising patient to call [facility] and reschedule their appointment.

## 2023-07-31 ENCOUNTER — TELEPHONE (OUTPATIENT)
Dept: HEMATOLOGY ONCOLOGY | Facility: CLINIC | Age: 23
End: 2023-07-31

## 2023-07-31 NOTE — TELEPHONE ENCOUNTER
Appointment Change  Cancel, Reschedule, Change to Virtual      Who are you speaking with? Patient   If it is not the patient, are they listed on an active communication consent form? N/A   Which provider is the appointment scheduled with? LEONARDO Sim   When is the appointment scheduled? Please list date and time  12/14/2023 @4PM    At which location is the appointment scheduled to take place? [id number]   Was the appointment rescheduled or changed from an in person visit to a virtual visit? If so, please list the details of the change. Yes, 12/18/2023 @2PM    What is the reason for the appointment change? Provider has PTO    Was [facility] transport scheduled for this visit? No   Does [facility] transport need to be scheduled for the new visit (if applicable) No   Does the patient need an infusion appointment rescheduled? No   Does the patient have an infusion appointment scheduled? If so, when? No   Is the patient undergoing chemotherapy? No   Was the no-show policy reviewed for appointments being changed with less then 24 hours of notice?  No

## 2023-07-31 NOTE — TELEPHONE ENCOUNTER
I called Gabriele Sarmiento regarding an appointment that they have scheduled with LEONARDO Trammell scheduled on 12/14/23      The line was answered but there was no response. This appointment would need to be rescheduled.      A [employer] message (if applicable) has been sent to patient relaying the above information and advising patient to call [facility] and reschedule their appointment.

## 2023-08-01 LAB
MYCOBACTERIUM SPEC CULT: NORMAL
RHODAMINE-AURAMINE STN SPEC: NORMAL

## 2023-09-11 DIAGNOSIS — D64.9 ANEMIA, UNSPECIFIED TYPE: Primary | ICD-10-CM

## 2023-10-02 ENCOUNTER — APPOINTMENT (OUTPATIENT)
Dept: LAB | Facility: CLINIC | Age: 23
End: 2023-10-02
Payer: COMMERCIAL

## 2023-10-02 DIAGNOSIS — D64.9 ANEMIA, UNSPECIFIED TYPE: ICD-10-CM

## 2023-10-02 LAB
BASOPHILS # BLD AUTO: 0.03 THOUSANDS/ÂΜL (ref 0–0.1)
BASOPHILS NFR BLD AUTO: 1 % (ref 0–1)
EOSINOPHIL # BLD AUTO: 0.08 THOUSAND/ÂΜL (ref 0–0.61)
EOSINOPHIL NFR BLD AUTO: 1 % (ref 0–6)
ERYTHROCYTE [DISTWIDTH] IN BLOOD BY AUTOMATED COUNT: 14.4 % (ref 11.6–15.1)
HCT VFR BLD AUTO: 37.1 % (ref 36.5–49.3)
HGB BLD-MCNC: 12.3 G/DL (ref 12–17)
IMM GRANULOCYTES # BLD AUTO: 0.03 THOUSAND/UL (ref 0–0.2)
IMM GRANULOCYTES NFR BLD AUTO: 1 % (ref 0–2)
LYMPHOCYTES # BLD AUTO: 1.77 THOUSANDS/ÂΜL (ref 0.6–4.47)
LYMPHOCYTES NFR BLD AUTO: 30 % (ref 14–44)
MCH RBC QN AUTO: 27.2 PG (ref 26.8–34.3)
MCHC RBC AUTO-ENTMCNC: 33.2 G/DL (ref 31.4–37.4)
MCV RBC AUTO: 82 FL (ref 82–98)
MONOCYTES # BLD AUTO: 0.47 THOUSAND/ÂΜL (ref 0.17–1.22)
MONOCYTES NFR BLD AUTO: 8 % (ref 4–12)
NEUTROPHILS # BLD AUTO: 3.48 THOUSANDS/ÂΜL (ref 1.85–7.62)
NEUTS SEG NFR BLD AUTO: 59 % (ref 43–75)
NRBC BLD AUTO-RTO: 0 /100 WBCS
PLATELET # BLD AUTO: 280 THOUSANDS/UL (ref 149–390)
PMV BLD AUTO: 10.9 FL (ref 8.9–12.7)
RBC # BLD AUTO: 4.53 MILLION/UL (ref 3.88–5.62)
WBC # BLD AUTO: 5.86 THOUSAND/UL (ref 4.31–10.16)

## 2023-10-02 PROCEDURE — 36415 COLL VENOUS BLD VENIPUNCTURE: CPT

## 2023-10-02 PROCEDURE — 85025 COMPLETE CBC W/AUTO DIFF WBC: CPT

## 2023-10-03 ENCOUNTER — APPOINTMENT (OUTPATIENT)
Dept: LAB | Facility: CLINIC | Age: 23
End: 2023-10-03
Payer: COMMERCIAL

## 2023-10-03 LAB — HEMOCCULT STL QL IA: NEGATIVE

## 2023-10-03 PROCEDURE — G0328 FECAL BLOOD SCRN IMMUNOASSAY: HCPCS

## 2023-10-04 ENCOUNTER — OFFICE VISIT (OUTPATIENT)
Dept: PULMONOLOGY | Facility: CLINIC | Age: 23
End: 2023-10-04
Payer: COMMERCIAL

## 2023-10-04 VITALS
WEIGHT: 239.5 LBS | OXYGEN SATURATION: 97 % | TEMPERATURE: 97.8 F | HEIGHT: 68 IN | SYSTOLIC BLOOD PRESSURE: 106 MMHG | HEART RATE: 91 BPM | BODY MASS INDEX: 36.3 KG/M2 | DIASTOLIC BLOOD PRESSURE: 64 MMHG

## 2023-10-04 DIAGNOSIS — J90 PLEURAL EFFUSION: Primary | ICD-10-CM

## 2023-10-04 DIAGNOSIS — R16.1 SPLENOMEGALY: ICD-10-CM

## 2023-10-04 DIAGNOSIS — D64.9 ANEMIA, UNSPECIFIED TYPE: ICD-10-CM

## 2023-10-04 DIAGNOSIS — G47.33 OBSTRUCTIVE SLEEP APNEA ON CPAP: ICD-10-CM

## 2023-10-04 DIAGNOSIS — R10.13 EPIGASTRIC DISCOMFORT: ICD-10-CM

## 2023-10-04 PROCEDURE — 99214 OFFICE O/P EST MOD 30 MIN: CPT | Performed by: INTERNAL MEDICINE

## 2023-10-04 NOTE — ASSESSMENT & PLAN NOTE
We will check H. pylori stool antigen or breath test specially with his chronic iron deficiency anemia.

## 2023-10-04 NOTE — ASSESSMENT & PLAN NOTE
Unclear etiology, exudative with lymphocytic and eosinophilic predominance. We talked about differential diagnosis, this could be atypical parapneumonic effusion but patient has multiple other features and had inflammatory markers elevated before even after resolution of all the above. No other systemic manifestations, I am not sure if you can link the iron deficiency anemia to this as well. Patient has a CT scan of the chest and abdomen ordered for November and I will follow results. I will send him for a quick check with ultrasound for any reaccumulation of the fluids. We will continue to monitor clinically for now. We will repeat CRP and sed rate before next visit.

## 2023-10-04 NOTE — ASSESSMENT & PLAN NOTE
Improving iron deficiency anemia on p.o. iron. No clear etiology. His occult blood stool test was negative. We will check UA to rule out any blood loss in the urine/hematuria.

## 2023-10-04 NOTE — ASSESSMENT & PLAN NOTE
Continue CPAP for now, he reports good compliance, no complaints.   We obtained information of his CPAP machine and will try to get more data and also his sleep study from [place]

## 2023-10-04 NOTE — PROGRESS NOTES
Progress note - Pulmonary Medicine   Cleaster Blunt 23 y.o. male MRN: [id number]       Impression & Plan:     Pleural effusion  Unclear etiology, exudative with lymphocytic and eosinophilic predominance. We talked about differential diagnosis, this could be atypical parapneumonic effusion but patient has multiple other features and had inflammatory markers elevated before even after resolution of all the above. No other systemic manifestations, I am not sure if you can link the iron deficiency anemia to this as well. Patient has a CT scan of the chest and abdomen ordered for November and I will follow results. I will send him for a quick check with ultrasound for any reaccumulation of the fluids. We will continue to monitor clinically for now. We will repeat CRP and sed rate before next visit. Obstructive sleep apnea on CPAP  Continue CPAP for now, he reports good compliance, no complaints. We obtained information of his CPAP machine and will try to get more data and also his sleep study from [place]    Anemia  Improving iron deficiency anemia on p.o. iron. No clear etiology. His occult blood stool test was negative. We will check UA to rule out any blood loss in the urine/hematuria. Splenomegaly  We will follow on CT scan in November. Epigastric discomfort  We will check H. pylori stool antigen or breath test specially with his chronic iron deficiency anemia. Return in about 6 months (around 4/4/2024). Diagnoses and all orders for this visit:    Pleural effusion  -     C-reactive protein; Future  -     Sedimentation rate, automated; Future  -     UA (URINE) with reflex to Scope    Obstructive sleep apnea on CPAP    Anemia, unspecified type  -     C-reactive protein; Future  -     Sedimentation rate, automated; Future  -     UA (URINE) with reflex to Scope  -     H. pylori antigen, stool; Future    Splenomegaly  -     C-reactive protein;  Future  -     Sedimentation rate, automated; Future  -     UA (URINE) with reflex to Scope    Epigastric discomfort  -     H. pylori antigen, stool; Future      ______________________________________________________________________    HPI:    Trixie Cook presents today for follow-up of pleural effusion. Patient presents today feeling much better, still feels some limitation in his right chest when he takes deep breath but does not feel that " crackling sensation" he had before anymore. Denies chest pain, has mild shortness of breath, denies fever or chills or night sweats, denies weight loss. Denies hemoptysis. Patient had this right-sided pleural effusion that was tapped in the past and was exudative with eosinophilia and at some point he had peripheral eosinophilia as well. No clear etiology was identified for the pleural effusion or eosinophilia. No change in medications, no significant travel history except in the [place] area, no new change in his environment and no new medications  Patient has chronic unexplained iron deficiency anemia that improved slightly with iron, I sent him for occult blood of the stool, denies history of GI bleed. He has some discomfort in his abdomen/epigastrium. Patient has obstructive sleep apnea and compliant with his CPAP, he brought the machine today with him. 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 Today he reported to me that his father has chronic iron deficiency anemia of unclear etiology and had work-up that was negative for    Patient just recovered from COVID-19 infection 2 weeks ago. Current Medications:  No current outpatient medications on file. Review of Systems:  Review of Systems   Constitutional: Negative for appetite change and fever. HENT: Negative for ear pain, postnasal drip, rhinorrhea, sneezing, sore throat and trouble swallowing. Respiratory: Positive for shortness of breath. Cardiovascular: Negative for chest pain. Musculoskeletal: Negative for myalgias. Neurological: Negative for headaches. Aside from what is mentioned in the HPI, the review of systems is otherwise negative    Past medical history, surgical history, and family history were reviewed and updated as appropriate    Social history updates:  Social History     Tobacco Use   Smoking Status Never   • Passive exposure: Never   Smokeless Tobacco Never       PhysicalExamination:  Vitals:   /64 (BP Location: Left arm, Patient Position: Sitting, Cuff Size: Standard)   Pulse 91   Temp 97.8 °F (36.6 °C) (Tympanic)   Ht 5' 8" (1.727 m)   Wt 109 kg (239 lb 8 oz)   SpO2 97%   BMI 36.42 kg/m²     General: alert, not in acute distress  HEENT: PERRL, no icteric sclera or cyanosis, no thrush  Neck: Supple, no lymphadenopathy or thyromegaly, no JVD  Lungs: Minimally decreased breath sounds at the right base, otherwise clear to auscultation bilaterally with no wheezing or crackles  Heart: S1S2 regular, no murmurs or gallops  Abdomen: soft, nontender, bowel sounds present  Extremities: no edema, no clubbing or cyanosis  Neuro: Alert and oriented x 3, no focal neurodeficits   Skin: intact, no rashes    Diagnostic Data:  Labs:   I personally reviewed the most recent laboratory data pertinent to today's visit    Lab Results   Component Value Date    WBC 5.86 10/02/2023    HGB 12.3 10/02/2023    HCT 37.1 10/02/2023    MCV 82 10/02/2023     10/02/2023     Lab Results   Component Value Date    SODIUM 137 05/11/2023    K 3.4 (L) 05/11/2023    CO2 28 05/11/2023     (H) 05/11/2023    BUN 12 05/11/2023    CREATININE 0.92 05/11/2023    CALCIUM 8.7 05/11/2023     Pleural fluid 6/13/2023: Glucose 96, , pH 7.5, protein 5.0, WBC 1955, lymphocytes 55%, eosinophils 20%, neutrophils 13%, monocytes 11%    Eosinophils on 5/11/2023: 10%/670, improved to 1%/80 on 10/2/2023    6/12/2023: ESR 25, CRP 13 (decreased from 27.1 in May)      Imaging:  I personally reviewed the images on the [facility] system pertinent to today's visit  Chest CT scan from March 2023 reviewed on PACS: In general normal lung parenchyma and no mediastinal lymphadenopathy but there is small area of atelectasis and anterior small pleural-based consolidation in the right middle lobe.   Very small right-sided pleural effusion.        PET scan from May 2023 reviewed on PACS with the patient in the room: No abnormal hypermetabolic activity, increased and now moderate size right-sided pleural effusion, splenomegaly or at least at the upper size of normal.       Other studies:  Echocardiogram: LVEF 60%, normal RV    Joanna Jovel MD  Answers for HPI/ROS submitted by the patient on 9/27/2023  Chronicity: chronic  When did you first notice your symptoms?: more than 1 month ago  How often do your symptoms occur?: every several days  Since you first noticed this problem, how has it changed?: gradually improving  Do you have shortness of breath that occurs with effort or exertion?: Yes  Do you have ear congestion?: No  Do you have heartburn?: No  Do you have fatigue?: No  Do you have nasal congestion?: No  Do you have shortness of breath when lying flat?: No  Do you have shortness of breath when you wake up?: No  Do you have sweats?: No  Have you experienced weight loss?: No  Which of the following makes your symptoms worse?: exercise, strenuous activity, URI  Which of the following makes your symptoms better?: rest

## 2023-10-17 ENCOUNTER — APPOINTMENT (OUTPATIENT)
Dept: LAB | Facility: CLINIC | Age: 23
End: 2023-10-17
Payer: COMMERCIAL

## 2023-10-17 DIAGNOSIS — J90 PLEURAL EFFUSION: ICD-10-CM

## 2023-10-17 DIAGNOSIS — D64.9 ANEMIA, UNSPECIFIED TYPE: ICD-10-CM

## 2023-10-17 DIAGNOSIS — R16.1 SPLENOMEGALY: ICD-10-CM

## 2023-10-17 LAB
BILIRUB UR QL STRIP: NEGATIVE
CLARITY UR: CLEAR
COLOR UR: COLORLESS
CRP SERPL QL: 4.1 MG/L
ERYTHROCYTE [SEDIMENTATION RATE] IN BLOOD: 13 MM/HOUR (ref 0–14)
GLUCOSE UR STRIP-MCNC: NEGATIVE MG/DL
HGB UR QL STRIP.AUTO: NEGATIVE
KETONES UR STRIP-MCNC: NEGATIVE MG/DL
LEUKOCYTE ESTERASE UR QL STRIP: NEGATIVE
NITRITE UR QL STRIP: NEGATIVE
PH UR STRIP.AUTO: 7 [PH]
PROT UR STRIP-MCNC: NEGATIVE MG/DL
SP GR UR STRIP.AUTO: 1.01 (ref 1–1.03)
UROBILINOGEN UR STRIP-ACNC: <2 MG/DL

## 2023-10-17 PROCEDURE — 86140 C-REACTIVE PROTEIN: CPT

## 2023-10-17 PROCEDURE — 85652 RBC SED RATE AUTOMATED: CPT

## 2023-10-17 PROCEDURE — 36415 COLL VENOUS BLD VENIPUNCTURE: CPT

## 2023-11-27 ENCOUNTER — HOSPITAL ENCOUNTER (OUTPATIENT)
Dept: RADIOLOGY | Facility: HOSPITAL | Age: 23
Discharge: HOME | End: 2023-11-27
Payer: COMMERCIAL

## 2023-11-27 DIAGNOSIS — R59.0 LYMPHADENOPATHY, MESENTERIC: ICD-10-CM

## 2023-11-27 PROCEDURE — 74177 CT ABD & PELVIS W/CONTRAST: CPT

## 2023-11-27 PROCEDURE — 71260 CT THORAX DX C+: CPT

## 2023-11-27 PROCEDURE — G1004 CDSM NDSC: HCPCS

## 2023-11-27 RX ADMIN — IOHEXOL 100 ML: 350 INJECTION, SOLUTION INTRAVENOUS at 15:27

## 2023-12-04 ENCOUNTER — TELEPHONE (OUTPATIENT)
Dept: HEMATOLOGY ONCOLOGY | Facility: CLINIC | Age: 23
End: 2023-12-04

## 2023-12-04 DIAGNOSIS — R16.1 SPLENOMEGALY: ICD-10-CM

## 2023-12-04 DIAGNOSIS — R59.0 LYMPHADENOPATHY, MESENTERIC: Primary | ICD-10-CM

## 2023-12-04 NOTE — TELEPHONE ENCOUNTER
Spoke with Tanisha Shipman to review the CT scan. His lungs show right pulmonary nodules as listed below. Informed him that I did send a message to his pulmonologist on how to move forward with further evaluation of these nodules. I will let him know as soon as I hear back from Dr. Piotr Messer. Informed him the right pleural effusion has resolved. However there is still some mesenteric lymphadenopathy, stable or slightly decreased. And splenomegaly persists at 14 cm. I am just concerned about the lymphadenopathy and splenomegaly therefore, I would like to have patient see GI for further evaluation, possible biopsy to rule out any kind of underlying lymphoma especially due to the splenomegaly. Referral placed and phone number provided to the patient to reach out and make an appointment as he is agreeable. Patient denies any B symptoms. He is doing well at this time. 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 Advised him to get his medical records from medical records department and establish care wherever he is placed. Patient agreeable. Informed him that I will call him back once I hear from pulmonology. He is aware to contact us for any additional questions/concerns. Pulmonary nodules  4 mm nodule right upper lobe 3/61, stable. 3 mm nodule right upper lobe 3/71, stable. Punctate nodule right upper lobe 3/82, not definitively seen on prior exam may be secondary to differences in slice thickness. 5 mm juxtapleural/perifissural nodule right upper lobe 3/94, stable. 3 mm nodule right middle lobe 3/100, this area was obscured on prior exams. 5 mm juxtapleural nodule right middle lobe 3/123, stable. 3 mm nodule right middle lobe 3/131, not definitively seen on prior exam may be secondary to differences in slice thickness. Previously seen right upper lobe opacity is resolved.

## 2023-12-14 ENCOUNTER — DOCUMENTATION (OUTPATIENT)
Dept: PULMONOLOGY | Facility: CLINIC | Age: 23
End: 2023-12-14

## 2023-12-14 NOTE — PROGRESS NOTES
I texted Georges Arguelles about his CT scan findings and I will discuss with him if he has any concerns.

## 2024-02-09 ENCOUNTER — TELEPHONE (OUTPATIENT)
Dept: HEMATOLOGY ONCOLOGY | Facility: CLINIC | Age: 24
End: 2024-02-09

## 2024-02-09 DIAGNOSIS — R59.0 LYMPHADENOPATHY, MESENTERIC: Primary | ICD-10-CM

## 2024-02-09 DIAGNOSIS — R16.1 SPLENOMEGALY: ICD-10-CM

## 2024-02-10 ENCOUNTER — APPOINTMENT (OUTPATIENT)
Dept: LAB | Facility: CLINIC | Age: 24
End: 2024-02-10
Payer: COMMERCIAL

## 2024-02-10 DIAGNOSIS — R59.0 LYMPHADENOPATHY, MESENTERIC: ICD-10-CM

## 2024-02-10 DIAGNOSIS — R16.1 SPLENOMEGALY: ICD-10-CM

## 2024-02-10 LAB
BASOPHILS # BLD AUTO: 0.03 THOUSANDS/ÂΜL (ref 0–0.1)
BASOPHILS NFR BLD AUTO: 1 % (ref 0–1)
EOSINOPHIL # BLD AUTO: 0.1 THOUSAND/ÂΜL (ref 0–0.61)
EOSINOPHIL NFR BLD AUTO: 2 % (ref 0–6)
ERYTHROCYTE [DISTWIDTH] IN BLOOD BY AUTOMATED COUNT: 14.2 % (ref 11.6–15.1)
HCT VFR BLD AUTO: 36.8 % (ref 36.5–49.3)
HGB BLD-MCNC: 12.2 G/DL (ref 12–17)
IMM GRANULOCYTES # BLD AUTO: 0.01 THOUSAND/UL (ref 0–0.2)
IMM GRANULOCYTES NFR BLD AUTO: 0 % (ref 0–2)
LDH SERPL-CCNC: 135 U/L (ref 140–271)
LYMPHOCYTES # BLD AUTO: 1.59 THOUSANDS/ÂΜL (ref 0.6–4.47)
LYMPHOCYTES NFR BLD AUTO: 35 % (ref 14–44)
MCH RBC QN AUTO: 28.1 PG (ref 26.8–34.3)
MCHC RBC AUTO-ENTMCNC: 33.2 G/DL (ref 31.4–37.4)
MCV RBC AUTO: 85 FL (ref 82–98)
MONOCYTES # BLD AUTO: 0.45 THOUSAND/ÂΜL (ref 0.17–1.22)
MONOCYTES NFR BLD AUTO: 10 % (ref 4–12)
NEUTROPHILS # BLD AUTO: 2.41 THOUSANDS/ÂΜL (ref 1.85–7.62)
NEUTS SEG NFR BLD AUTO: 52 % (ref 43–75)
NRBC BLD AUTO-RTO: 0 /100 WBCS
PLATELET # BLD AUTO: 235 THOUSANDS/UL (ref 149–390)
PMV BLD AUTO: 10.9 FL (ref 8.9–12.7)
RBC # BLD AUTO: 4.34 MILLION/UL (ref 3.88–5.62)
WBC # BLD AUTO: 4.59 THOUSAND/UL (ref 4.31–10.16)

## 2024-02-10 PROCEDURE — 85025 COMPLETE CBC W/AUTO DIFF WBC: CPT

## 2024-02-10 PROCEDURE — 83615 LACTATE (LD) (LDH) ENZYME: CPT

## 2024-02-10 PROCEDURE — 36415 COLL VENOUS BLD VENIPUNCTURE: CPT

## 2024-02-12 ENCOUNTER — OFFICE VISIT (OUTPATIENT)
Dept: HEMATOLOGY ONCOLOGY | Facility: CLINIC | Age: 24
End: 2024-02-12
Payer: COMMERCIAL

## 2024-02-12 ENCOUNTER — APPOINTMENT (OUTPATIENT)
Dept: LAB | Facility: CLINIC | Age: 24
End: 2024-02-12
Payer: COMMERCIAL

## 2024-02-12 VITALS
OXYGEN SATURATION: 97 % | RESPIRATION RATE: 17 BRPM | BODY MASS INDEX: 36.37 KG/M2 | DIASTOLIC BLOOD PRESSURE: 68 MMHG | WEIGHT: 240 LBS | HEART RATE: 100 BPM | HEIGHT: 68 IN | SYSTOLIC BLOOD PRESSURE: 132 MMHG | TEMPERATURE: 98.1 F

## 2024-02-12 DIAGNOSIS — D53.9 NUTRITIONAL ANEMIA: ICD-10-CM

## 2024-02-12 DIAGNOSIS — E61.1 IRON DEFICIENCY: ICD-10-CM

## 2024-02-12 DIAGNOSIS — D50.9 IRON DEFICIENCY ANEMIA, UNSPECIFIED IRON DEFICIENCY ANEMIA TYPE: ICD-10-CM

## 2024-02-12 DIAGNOSIS — R59.0 CERVICAL LYMPHADENOPATHY: Primary | ICD-10-CM

## 2024-02-12 DIAGNOSIS — R59.0 CERVICAL LYMPHADENOPATHY: ICD-10-CM

## 2024-02-12 LAB
BILIRUB DIRECT SERPL-MCNC: 0.19 MG/DL (ref 0–0.2)
BILIRUB SERPL-MCNC: 0.55 MG/DL (ref 0.2–1)
CRP SERPL QL: 3.3 MG/L
ERYTHROCYTE [SEDIMENTATION RATE] IN BLOOD: 12 MM/HOUR (ref 0–14)
FERRITIN SERPL-MCNC: 13 NG/ML (ref 24–336)
FOLATE SERPL-MCNC: 8.4 NG/ML
IRON SATN MFR SERPL: 28 % (ref 15–50)
IRON SERPL-MCNC: 116 UG/DL (ref 50–212)
RETICS # AUTO: ABNORMAL 10*3/UL (ref 14356–105094)
RETICS # CALC: 2.42 % (ref 0.37–1.87)
TIBC SERPL-MCNC: 410 UG/DL (ref 250–450)
UIBC SERPL-MCNC: 294 UG/DL (ref 155–355)

## 2024-02-12 PROCEDURE — 83540 ASSAY OF IRON: CPT

## 2024-02-12 PROCEDURE — 86140 C-REACTIVE PROTEIN: CPT

## 2024-02-12 PROCEDURE — 82607 VITAMIN B-12: CPT

## 2024-02-12 PROCEDURE — 83010 ASSAY OF HAPTOGLOBIN QUANT: CPT

## 2024-02-12 PROCEDURE — 85045 AUTOMATED RETICULOCYTE COUNT: CPT

## 2024-02-12 PROCEDURE — 82746 ASSAY OF FOLIC ACID SERUM: CPT

## 2024-02-12 PROCEDURE — 83550 IRON BINDING TEST: CPT

## 2024-02-12 PROCEDURE — 99213 OFFICE O/P EST LOW 20 MIN: CPT

## 2024-02-12 PROCEDURE — 85652 RBC SED RATE AUTOMATED: CPT

## 2024-02-12 PROCEDURE — 82248 BILIRUBIN DIRECT: CPT

## 2024-02-12 PROCEDURE — 36415 COLL VENOUS BLD VENIPUNCTURE: CPT

## 2024-02-12 PROCEDURE — 82728 ASSAY OF FERRITIN: CPT

## 2024-02-12 PROCEDURE — 82247 BILIRUBIN TOTAL: CPT

## 2024-02-12 NOTE — PROGRESS NOTES
HEMATOLOGY / ONCOLOGY CLINIC FOLLOW UP NOTE    Primary Care Provider: Turner Bledsoe  Referring Provider:    MRN: [id number]  : 2000    Reason for Encounter: follow up mesenteric lymphadenopathy            Interval History: Patient presents for follow-up of his mesenteric lymphadenopathy.  He is on rotation and his cervical nodes were ultrasound where they were found to be enlarged.  Patient reports he had COVID in December around the same time with these nodes were seen.  Was able to palpate the pedal and left cervical anterior lymph nodes.  Patient denies fevers, frequent infections, unintentional weight loss, drenching night sweats, decreased appetite or early satiety.  He occasionally has some tenderness in the left upper quadrant.  He does have splenomegaly.    We had obtained a PET scan 2023 where the mesenteric nodes did not demonstrate significant FDG activity.  Repeat CT scan showed enlarged spleen, 14 cm.  Subcentimeter mesenteric lymph nodes similar to slightly smaller in size compared with the prior exam.  Largest 1 measuring 8 mm.    Patient was also found to be iron deficient and started taking oral iron supplements daily.  He has an appointment with GI for further evaluation of his mesenteric lymphadenopathy and iron deficiency.     Labs:    2/10/2024: Hgb 12.2, MCV 85, WBC 4.59, platelets 235,000, otherwise in unremarkable differential,     10/17/2023: CRP 4.1, sed rate 13 (WNL)    2023: Ferritin 19, serum iron 30, iron saturation 8%        REVIEW OF SYSTEMS:  Please note that a 14-point review of systems was performed to include Constitutional, HEENT, Respiratory, CVS, GI, , Musculoskeletal, Integumentary, Neurologic, Rheumatologic, Endocrinologic, Psychiatric, Lymphatic, and Hematologic/Oncologic systems were reviewed and are negative unless otherwise stated in HPI. Positive and negative findings pertinent to this evaluation are incorporated into the history of present  illness.      ECOG PS: 0    HPI: Turner Dye was seen for initial consultation 5/11/2023 regarding mesenteric lymphadenopathy. Patient has no significant PMH. He has ALBERTINA and uses a CPAP, which he reports he didn't clean that often. 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 On 4/23, he went to [place] to visit family, at some burgers and that night started developing RUQ pain radiating to his lower back. The pain worsened through the night, patient presented to a local hospital.      A CT chest/abdomen/pelvis was obtained, which showed mild splenomegaly, small pleural effusion, nonspecific enlarged mesenteric lymph node within the LUQ of the abdomen measuring up to 1.1 cm. Increased number of mesenteric lymph nodes.   An abdominal ultrasound was obtained 4/26 due to continued RUQ pain, which showed a slightly contracted gallbladder, no gallbladder abnormality was seen.      Patient also experiencing intense mid chest pain a few days ago, which lasts about 5 hours. He is having increased shortness of breath when going up a couple of flights of stairs. Patient denies increased fatigue, fevers, frequent infections, drenching night sweats, unintentional weight loss, decreased appetite. He is continuing to have the RUQ pain.     PET/CT 5/25/23:  1. Mesenteric nodes do not demonstrate significant FDG activity, and may just be reactive. Consider CT follow-up in 6 months to demonstrate stability.  2. Minimal improvement in right middle infiltrate/atelectasis. Increased size of moderate right pleural effusion. This may be inflammatory. Correlate clinically. Follow-up to resolution recommended.  3. Stable mild splenomegaly but without significant FDG uptake. This may also be reassessed on follow-up CT.  4. Otherwise there are no hypermetabolic lesions that are concerning for malignancy/metastases.    CT chest abdomen pelvis 11/27/2023:  Interval resolution of the right upper lobe opacity and right pleural effusion.   Multiple  right-sided pulmonary nodules measuring up to 5 mm majority of which appear stable compared to prior. 2 nodules not definitively seen on prior exams which may be related to differences in image acquisition/slice thicknesses. Based on current Fleischner Society 2017 Guidelines on incidental pulmonary nodule, optional follow-up CT at 12 months can be considered.  Stable mild splenomegaly at 14 cm.  Subcentimeter mesenteric lymph nodes similar to slightly smaller in size compared to prior.    Patient did have a thoracentesis, fluid was negative for malignancy.    Labs: , CRP 27.1, EBV panel negative, Hepatitis Panel negative, HIV negative, Hgb 11.8, MCV 82, WBC 7.04, Platelets [id number].     PROBLEM LIST:  Patient Active Problem List   Diagnosis    Pleural effusion    Obstructive sleep apnea on CPAP    Atypical pneumonia    Splenomegaly    Anemia    Epigastric discomfort       Assessment / Plan: Discussed that we will obtain a CT soft tissue of the neck to assess the lymphadenopathy palpated.  In addition, we will repeat this iron panel to evaluate patient's iron deficiency since he has been on oral iron supplements.  We will recheck his sed rate/CRP, reticulocyte, haptoglobin, T. bili to rule out hemolysis.  I will call patient with results to formulate a plan moving forward.  Patient is agreeable to plan.  He is aware to contact us for any additional questions/concerns.    Addendum: Spoke with patient to review his blood work.  Patient's hemolysis workup was negative, he is definitely iron deficient with ferritin of 13.  He started taking oral iron supplements a couple of weeks ago.  Patient denies any abnormal bleeding.  Patient will benefit from IV iron treatments, Venofer 300 mg x 4 weekly doses.  However, the etiology of his iron deficiency is unclear at this time.  In addition, patient's folate acid is on the low end of normal, recommend he start folic acid 400 mcg daily.  He will pick that up from the  pharmacy.     Patient has had an appointment with GI, recommend that we have GI studies completed with a EGD/colonoscopy for source of anemia.    Patient will be leaving to go to [place] on February 25, 2024, then he is headed back home to [place].  His Venofer treatments may be a little  and not done weekly due to the travel.  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  Advised him to establish care wherever he is up with hematology/oncology depending on what we find with the EGD/colonoscopy and the CT scan of the neck.  Patient in agreement with the plan.  Informed him that our scheduling team will be in touch to schedule the iron infusions.  Advised him to stop taking the oral iron supplements when he starts the IV iron treatments.  Patient educated about the iron product, administration and common adverse effects including but not limited to: Anaphylaxis/allergic reaction, arthralgias/myalgias, nausea; agrees to proceed with treatment.     Labs:  2/12/2024: Serum iron 116, iron saturation 20%, ferritin 13, vitamin B12 pending, folate 8.4        I spent 20 minutes on chart review, face to face counseling time, coordination of care and documentation.    Past Medical History:   has a past medical history of Anemia (4/23/23), Pleural effusion (4/23/23), and Sleep apnea, obstructive (2/2020).    PAST SURGICAL HISTORY:   has a past surgical history that includes IR thoracentesis (6/13/2023).    CURRENT MEDICATIONS  No current outpatient medications on file.     No current facility-administered medications for this visit.     [unfilled]    SOCIAL HISTORY:   reports that he has never smoked. He has never been exposed to tobacco smoke. He has never used smokeless tobacco. He reports current alcohol use. He reports that he does not use drugs.     FAMILY HISTORY:  family history includes Breast cancer (age of onset: 70) in his maternal  "grandmother; Cancer in his maternal grandmother; Diabetes in his father, maternal grandfather, maternal grandmother, and mother; Lymphoma (age of onset: 70) in his maternal grandmother.     ALLERGIES:  is allergic to augmentin [amoxicillin-pot clavulanate].      Physical Exam:  Vital Signs:   Visit Vitals  /68 (BP Location: Left arm, Patient Position: Sitting, Cuff Size: Adult)   Pulse 100   Temp 98.1 °F (36.7 °C)   Resp 17   Ht 5' 8\" (1.727 m)   Wt 109 kg (240 lb)   SpO2 97%   BMI 36.49 kg/m²   Smoking Status Never   BSA 2.21 m²     Body mass index is 36.49 kg/m².  Body surface area is 2.21 meters squared.    GEN: Alert, awake oriented x3, in no acute distress  HEENT- No pallor, icterus, cyanosis, no oral mucosal lesions,   LAD - no palpable cervical, clavicle, axillary, inguinal LAD  Heart- normal S1 S2, regular rate and rhythm, No murmur, rubs.   Lungs- clear breathing sound bilateral.   Abdomen- soft, Non tender, bowel sounds present  Extremities- No cyanosis, clubbing, edema  Neuro- No focal neurological deficit    Labs:  Lab Results   Component Value Date    WBC 4.59 02/10/2024    HGB 12.2 02/10/2024    HCT 36.8 02/10/2024    MCV 85 02/10/2024     02/10/2024     Lab Results   Component Value Date    SODIUM 137 05/11/2023    K 3.4 (L) 05/11/2023     (H) 05/11/2023    CO2 28 05/11/2023    AGAP 0 (L) 05/11/2023    BUN 12 05/11/2023    CREATININE 0.92 05/11/2023    GLUC 89 05/11/2023    CALCIUM 8.7 05/11/2023    AST 23 05/11/2023    ALT 26 05/11/2023    ALKPHOS 70 05/11/2023    TP 7.8 05/11/2023    TBILI 0.63 05/11/2023    EGFR 116 05/11/2023     "

## 2024-02-13 ENCOUNTER — TELEPHONE (OUTPATIENT)
Dept: HEMATOLOGY ONCOLOGY | Facility: CLINIC | Age: 24
End: 2024-02-13

## 2024-02-13 PROBLEM — E61.1 IRON DEFICIENCY: Status: ACTIVE | Noted: 2024-02-13

## 2024-02-13 LAB — HAPTOGLOB SERPL-MCNC: 63 MG/DL (ref 17–317)

## 2024-02-13 RX ORDER — SODIUM CHLORIDE 9 MG/ML
20 INJECTION, SOLUTION INTRAVENOUS ONCE
Status: CANCELLED | OUTPATIENT
Start: 2024-02-19

## 2024-02-13 NOTE — TELEPHONE ENCOUNTER
Please schedule iron infusions.  I am trying to get the patient in for next week due to him leaving [facility].  His schedule will be a little bit broken up and I will reach out to finance to see if I am able to start next week.

## 2024-02-14 ENCOUNTER — LAB (OUTPATIENT)
Dept: LAB | Facility: CLINIC | Age: 24
End: 2024-02-14
Payer: COMMERCIAL

## 2024-02-14 ENCOUNTER — CONSULT (OUTPATIENT)
Dept: GASTROENTEROLOGY | Facility: CLINIC | Age: 24
End: 2024-02-14
Payer: COMMERCIAL

## 2024-02-14 VITALS
SYSTOLIC BLOOD PRESSURE: 116 MMHG | DIASTOLIC BLOOD PRESSURE: 78 MMHG | BODY MASS INDEX: 36.53 KG/M2 | TEMPERATURE: 99.7 F | WEIGHT: 241 LBS | HEIGHT: 68 IN

## 2024-02-14 DIAGNOSIS — E61.1 IRON DEFICIENCY: Primary | ICD-10-CM

## 2024-02-14 DIAGNOSIS — E61.1 IRON DEFICIENCY: ICD-10-CM

## 2024-02-14 DIAGNOSIS — R16.1 SPLENOMEGALY: ICD-10-CM

## 2024-02-14 DIAGNOSIS — R59.0 LYMPHADENOPATHY, MESENTERIC: ICD-10-CM

## 2024-02-14 PROCEDURE — 99203 OFFICE O/P NEW LOW 30 MIN: CPT | Performed by: INTERNAL MEDICINE

## 2024-02-14 PROCEDURE — 86364 TISS TRNSGLTMNASE EA IG CLAS: CPT

## 2024-02-14 PROCEDURE — 86258 DGP ANTIBODY EACH IG CLASS: CPT

## 2024-02-14 PROCEDURE — 82784 ASSAY IGA/IGD/IGG/IGM EACH: CPT

## 2024-02-14 PROCEDURE — 36415 COLL VENOUS BLD VENIPUNCTURE: CPT

## 2024-02-14 PROCEDURE — 86231 EMA EACH IG CLASS: CPT

## 2024-02-14 RX ORDER — POLYETHYLENE GLYCOL 3350 17 G/17G
238 POWDER, FOR SOLUTION ORAL ONCE
Qty: 238 G | Refills: 0 | Status: SHIPPED | OUTPATIENT
Start: 2024-02-14 | End: 2024-02-14

## 2024-02-14 NOTE — PROGRESS NOTES
[facility] - Outpatient Consultation  Turner Dye 23 y.o. male MRN: [id number]  Encounter: [id number]        ASSESSMENT AND PLAN     1. Lymphadenopathy, mesenteric    2. Iron deficiency    3. Splenomegaly    Lymphadenopathy initially seen on CT chest abdomen pelvis done in [facility] at the time of diagnosis of pleural effusion. Since then has had follow-ups with hematology, has had PET scan done in May 2023  flow cytometry negative for any concern for lymphoma, as well which was suggestive of no significant FDG uptake concerning for reactive lymphadenopathy, stable but mild splenomegaly without significant FDG uptake, no hypermetabolic lesions.  Had follow-up CT chest abdomen pelvis in November 2023 which showed subcentimeter mesenteric lymph nodes similar or slightly smaller than prior exam, stable splenomegaly.  His most recent iron panel done on 2/12/2024 shows iron saturation 28, TIBC 410, iron 116, reticulocyte count 2.4%, CRP 3.3, ESR normal.    For his otherwise unexplained chronic iron deficiency I will schedule him for EGD and colonoscopy with random duodenal, gastric and esophageal biopsies along with TI intubation.  We will also consider random TI biopsies.  I will also check celiac panel as well as fecal calprotectin.    Discussed that if the work up is negative we will consider capsule endoscopy.     Office follow up in 3 months    Orders Placed This Encounter   Procedures   • Calprotectin,Fecal   • Celiac Disease Antibody Profile   • Colonoscopy   • EGD         HISTORY OF PRESENT ILLNESS     HPI    23-year-old male with past medical history including but not limited to splenomegaly, chronic iron deficiency, ALBERTINA who was referred by his hematologist for chronic iron deficiency anemia.  Patient reports that he was in usual state of health prior to last year however in April 2023 had sudden onset right sided chest pain which was found to be due to right  sided pleural effusion.  At that time he had CT chest abdomen pelvis which showed mild splenomegaly, small right-sided pleural effusion, nonspecific concentric lymphadenopathy measuring up to 1.1 cm.  He subsequently followed up with hematology, had PET scan which showed enlarged mesenteric lymph node concerning for inflammatory lymph nodes with mild uptake, moderate right-sided pleural effusion.  Subsequently patient was referred to pulmonary team, underwent right-sided thoracentesis which showed analysis showed exudative pleural effusion with minimally elevated eosinophils.  He had follow-up repeat CT abdomen in November 2023 which showed stable splenomegaly at 14 cm, subcentimeter mesenteric lymph node appearing slightly smaller than prior exam.  For his chronic iron deficiency, in June 2023 iron 30, ferritin 19, iron saturation 8, TIBC 371.  Was started on p.o. supplements with improvement in iron panel 2/12/2024 showing iron 116, ferritin 13, iron saturation 28.  He has had inflammatory markers tested as well most recently CRP 3.3, H. pylori stool antigen negative, ESR normal.  LFTs have been normal as well.    On my encounter today patient denies any GI pertinent complaints, denies any significant abdominal pain, nausea, vomiting, diarrhea, changes in bowel habits, blood in stool, intolerance to any particular kinds of diet.  Denies any significant allergic history like asthma, atopic rashes.  Denies any new medications recently.  Denies any smoking, aggressive alcohol use, recreational substance use.  Denies any significant NSAID use.  No prior EGD or colonoscopy.  Family history significant for follicular lymphoma in grandmother, no GI pertinent history in the family.    REVIEW OF SYSTEMS     CONSTITUTIONAL: Denies any fever, chills, rigors, and weight loss.  HEENT: No earache or tinnitus. Denies hearing loss or visual disturbances.  CARDIOVASCULAR: No chest pain or palpitations.   RESPIRATORY: Denies any  "cough, hemoptysis, shortness of breath or dyspnea on exertion.  GASTROINTESTINAL: As noted in the History of Present Illness.   GENITOURINARY: No problems with urination. Denies any hematuria or dysuria.  NEUROLOGIC: No dizziness or vertigo, denies headaches.   MUSCULOSKELETAL: Denies any muscle or joint pain.   SKIN: Denies skin rashes or itching.   ENDOCRINE: Denies excessive thirst. Denies intolerance to heat or cold.  PSYCHOSOCIAL: Denies depression or anxiety. Denies any recent memory loss.       Historical information     Past Medical History:   Diagnosis Date   • Anemia 23   • Pleural effusion 23   • Sleep apnea, obstructive 2020    uses Cpap-[employer]     Past Surgical History:   Procedure Laterality Date   • IR THORACENTESIS  2023     Social History   Social History     Substance and Sexual Activity   Alcohol Use Yes    Comment: Social use (3-5 drinks/month)     Social History     Substance and Sexual Activity   Drug Use Never     Social History     Tobacco Use   Smoking Status Never   • Passive exposure: Never   Smokeless Tobacco Never     Family History   Problem Relation Age of Onset   • Lymphoma Maternal Grandmother 70        non hodgkins   • Breast cancer Maternal Grandmother 70   • Cancer Maternal Grandmother         Alive, non-Hodgkin's lymphoma, breast cancer   • Diabetes Maternal Grandmother         Alive   • Diabetes Mother         Alive   • Diabetes Father         Alive   • Diabetes Maternal Grandfather                Allergies       Current Outpatient Medications:   •  polyethylene glycol (MiraLax) 17 GM/SCOOP powder    Allergies   Allergen Reactions   • Augmentin [Amoxicillin-Pot Clavulanate] Rash           Objective assessment       Blood pressure 116/78, temperature 99.7 °F (37.6 °C), temperature source Tympanic, height 5' 8\" (1.727 m), weight 109 kg (241 lb). Body mass index is 36.64 kg/m².        PHYSICAL EXAM:         General Appearance:   Alert, cooperative, no " distress   HEENT:   Normocephalic, cervical LAD noted   Neck:  symmetrical   Lungs:   Breathing unlabored, able to speak in full sentences    Heart::   Regular rate    Abdomen:   Non distended on inspection   Genitalia:   Deferred    Rectal:   Deferred    Extremities:  No cyanosis, or edema    Pulses:  Strong   Skin:  No jaundice, rashes, or lesions    Lymph nodes:  No palpable cervical lymphadenopathy        Lab Results:      No visits with results within 1 Day(s) from this visit.   Latest known visit with results is:   Appointment on 02/12/2024   Component Date Value   • Folate 02/12/2024 8.4    • Retic Ct Abs 02/12/2024 110,100 (H)    • Retic Ct Pct 02/12/2024 2.42 (H)    • Haptoglobin 02/12/2024 63    • CRP 02/12/2024 3.3 (H)    • Sed Rate 02/12/2024 12    • Total Bilirubin 02/12/2024 0.55    • Bilirubin, Direct 02/12/2024 0.19    • Iron Saturation 02/12/2024 28    • TIBC 02/12/2024 410    • Iron 02/12/2024 116    • UIBC 02/12/2024 294    • Ferritin 02/12/2024 13 (L)          Radiology Results:      No results found.      Toribio Glez MD  Gastroenteroloy Fellow

## 2024-02-14 NOTE — PATIENT INSTRUCTIONS
Scheduled date of EGD/colonoscopy (as of today):03/20/2024  Physician performing EGD/colonoscopy:Rosa  Location of EGD/colonoscopy:[place]  Desired bowel prep reviewed with patient: Miralax Mag  Instructions reviewed with patient by:Celsa  Clearances:  NONE    Patient is also scheduled for a 3 month follow up for 05/09/2024 with Rosa at [facility]

## 2024-02-15 ENCOUNTER — APPOINTMENT (OUTPATIENT)
Dept: LAB | Facility: CLINIC | Age: 24
End: 2024-02-15

## 2024-02-15 LAB
ENDOMYSIUM IGA SER QL: NEGATIVE
GLIADIN PEPTIDE IGA SER-ACNC: 6 UNITS (ref 0–19)
GLIADIN PEPTIDE IGG SER-ACNC: 3 UNITS (ref 0–19)
IGA SERPL-MCNC: 248 MG/DL (ref 90–386)
TTG IGA SER-ACNC: <2 U/ML (ref 0–3)
TTG IGG SER-ACNC: 5 U/ML (ref 0–5)
VIT B12 SERPL-MCNC: 277 PG/ML (ref 180–914)

## 2024-02-16 NOTE — RESULT ENCOUNTER NOTE
Jesús Addison  Celiac blood work was negative. Will wait for egd and colonoscopy report.     Toribio Glez MD  Fellow   Department of Gastroenterology  [facility] - [place], [place].

## 2024-02-19 ENCOUNTER — HOSPITAL ENCOUNTER (OUTPATIENT)
Dept: INFUSION CENTER | Facility: HOSPITAL | Age: 24
Discharge: HOME | End: 2024-02-19
Attending: INTERNAL MEDICINE
Payer: COMMERCIAL

## 2024-02-19 DIAGNOSIS — E61.1 IRON DEFICIENCY: Primary | ICD-10-CM

## 2024-02-19 PROCEDURE — 96365 THER/PROPH/DIAG IV INF INIT: CPT

## 2024-02-19 PROCEDURE — 96366 THER/PROPH/DIAG IV INF ADDON: CPT

## 2024-02-19 RX ORDER — SODIUM CHLORIDE 9 MG/ML
20 INJECTION, SOLUTION INTRAVENOUS ONCE
Status: COMPLETED | OUTPATIENT
Start: 2024-02-19 | End: 2024-02-19

## 2024-02-19 RX ORDER — SODIUM CHLORIDE 9 MG/ML
20 INJECTION, SOLUTION INTRAVENOUS ONCE
OUTPATIENT
Start: 2024-02-26

## 2024-02-19 RX ADMIN — SODIUM CHLORIDE 20 ML/HR: 0.9 INJECTION, SOLUTION INTRAVENOUS at 12:32

## 2024-02-19 RX ADMIN — IRON SUCROSE 300 MG: 20 INJECTION, SOLUTION INTRAVENOUS at 12:32

## 2024-02-19 NOTE — PROGRESS NOTES
Turner Dye  tolerated treatment well with no complications.      Turner Dye is aware of future appt on 3/14/24 at 1300.     AVS  Declined by Turner Dye

## 2024-02-22 ENCOUNTER — APPOINTMENT (OUTPATIENT)
Dept: LAB | Facility: CLINIC | Age: 24
End: 2024-02-22
Payer: COMMERCIAL

## 2024-02-22 DIAGNOSIS — R59.0 LYMPHADENOPATHY, MESENTERIC: ICD-10-CM

## 2024-02-22 DIAGNOSIS — E61.1 IRON DEFICIENCY: ICD-10-CM

## 2024-02-22 PROCEDURE — 83993 ASSAY FOR CALPROTECTIN FECAL: CPT

## 2024-02-23 ENCOUNTER — HOSPITAL ENCOUNTER (OUTPATIENT)
Dept: RADIOLOGY | Facility: HOSPITAL | Age: 24
Discharge: HOME | End: 2024-02-23
Payer: COMMERCIAL

## 2024-02-23 DIAGNOSIS — R59.0 CERVICAL LYMPHADENOPATHY: ICD-10-CM

## 2024-02-23 PROCEDURE — G1004 CDSM NDSC: HCPCS

## 2024-02-23 PROCEDURE — 70491 CT SOFT TISSUE NECK W/DYE: CPT

## 2024-02-23 RX ADMIN — IOHEXOL 85 ML: 350 INJECTION, SOLUTION INTRAVENOUS at 15:58

## 2024-02-27 LAB — CALPROTECTIN STL-MCNT: 139 UG/G (ref 0–120)

## 2024-02-28 NOTE — RESULT ENCOUNTER NOTE
Jesús Addison  The fecal calprotectin was borderline elevated. I would suggest that we proceed with colonoscopy and endoscopy as we had previously discussed. Depending on the results of the colonoscopy we may repeat fecal calprotectin again. Please let me know or call our office if you have any questions or concerns.    Toribio Glez MD  Fellow   Department of Gastroenterology  [facility] [place] [place].

## 2024-03-01 ENCOUNTER — PATIENT MESSAGE (OUTPATIENT)
Dept: HEMATOLOGY ONCOLOGY | Facility: CLINIC | Age: 24
End: 2024-03-01

## 2024-03-01 ENCOUNTER — TELEPHONE (OUTPATIENT)
Dept: HEMATOLOGY ONCOLOGY | Facility: CLINIC | Age: 24
End: 2024-03-01

## 2024-03-01 DIAGNOSIS — R59.0 SUBMANDIBULAR LYMPHADENOPATHY: Primary | ICD-10-CM

## 2024-03-01 NOTE — TELEPHONE ENCOUNTER
Spoke with Azael to review his CT scan.    Abnormal 1.6 cm left level 1B lymph node. Reactive/infectious or inflammatory as well as neoplastic etiologies are in the differential diagnosis. Further clinical assessment advised.  Cystic lesion in the floor of the left maxilla associated with an impacted unerupted tooth possibly a dentigerous cyst. Dental assessment advised.    Discussed seeing ENT for possible biopsy.  Patient agreeable, referral placed and sent patient phone number as he is in [place] at this time.

## 2024-03-04 ENCOUNTER — DOCUMENTATION (OUTPATIENT)
Dept: HEMATOLOGY ONCOLOGY | Facility: CLINIC | Age: 24
End: 2024-03-04

## 2024-03-05 ENCOUNTER — DOCUMENTATION (OUTPATIENT)
Dept: HEMATOLOGY ONCOLOGY | Facility: CLINIC | Age: 24
End: 2024-03-05

## 2024-03-05 NOTE — PROGRESS NOTES
Pt called me back & I went over his ins benefits & he thanked me or the call.. he sd that he's going to wait until after he gets a bill to see if he will sami help w/this venofer. If he does he will call me back,if I don't hear back from him then he sd that he's going to be ok w/the cost

## 2024-03-11 DIAGNOSIS — E61.1 IRON DEFICIENCY: Primary | ICD-10-CM

## 2024-03-11 RX ORDER — SODIUM CHLORIDE 9 MG/ML
20 INJECTION, SOLUTION INTRAVENOUS ONCE
Status: CANCELLED | OUTPATIENT
Start: 2024-03-14

## 2024-03-12 ENCOUNTER — TELEPHONE (OUTPATIENT)
Dept: GASTROENTEROLOGY | Facility: CLINIC | Age: 24
End: 2024-03-12

## 2024-03-12 NOTE — TELEPHONE ENCOUNTER
I called and lvm for the patient that their appointment on 5/9/24 with Dr. Lee needs to be rescheduled because he will be out of the office.

## 2024-03-14 ENCOUNTER — ANESTHESIA (OUTPATIENT)
Dept: ANESTHESIOLOGY | Facility: HOSPITAL | Age: 24
End: 2024-03-14

## 2024-03-14 ENCOUNTER — ANESTHESIA EVENT (OUTPATIENT)
Dept: ANESTHESIOLOGY | Facility: HOSPITAL | Age: 24
End: 2024-03-14

## 2024-03-14 ENCOUNTER — HOSPITAL ENCOUNTER (OUTPATIENT)
Dept: INFUSION CENTER | Facility: HOSPITAL | Age: 24
Discharge: HOME | End: 2024-03-14
Attending: INTERNAL MEDICINE
Payer: COMMERCIAL

## 2024-03-14 VITALS
HEART RATE: 70 BPM | RESPIRATION RATE: 18 BRPM | SYSTOLIC BLOOD PRESSURE: 125 MMHG | DIASTOLIC BLOOD PRESSURE: 64 MMHG | TEMPERATURE: 97.6 F

## 2024-03-14 DIAGNOSIS — E61.1 IRON DEFICIENCY: Primary | ICD-10-CM

## 2024-03-14 PROCEDURE — 96365 THER/PROPH/DIAG IV INF INIT: CPT

## 2024-03-14 RX ORDER — SODIUM CHLORIDE 9 MG/ML
20 INJECTION, SOLUTION INTRAVENOUS ONCE
Status: CANCELLED | OUTPATIENT
Start: 2024-03-23

## 2024-03-14 RX ORDER — SODIUM CHLORIDE 9 MG/ML
20 INJECTION, SOLUTION INTRAVENOUS ONCE
Status: COMPLETED | OUTPATIENT
Start: 2024-03-14 | End: 2024-03-14

## 2024-03-14 RX ADMIN — SODIUM CHLORIDE 20 ML/HR: 0.9 INJECTION, SOLUTION INTRAVENOUS at 13:16

## 2024-03-14 RX ADMIN — IRON SUCROSE 300 MG: 20 INJECTION, SOLUTION INTRAVENOUS at 13:16

## 2024-03-14 NOTE — PROGRESS NOTES
Turner Dye  tolerated treatment well with no complications.      Turner Dye is aware of future appt on 3/23 at 12:30.     AVS printed and given to Turner Dye:    No (Declined by Turner Dye)   '

## 2024-03-18 ENCOUNTER — TELEPHONE (OUTPATIENT)
Dept: GASTROENTEROLOGY | Facility: HOSPITAL | Age: 24
End: 2024-03-18

## 2024-03-19 ENCOUNTER — ANESTHESIA (OUTPATIENT)
Dept: ANESTHESIOLOGY | Facility: HOSPITAL | Age: 24
End: 2024-03-19

## 2024-03-19 ENCOUNTER — ANESTHESIA EVENT (OUTPATIENT)
Dept: ANESTHESIOLOGY | Facility: HOSPITAL | Age: 24
End: 2024-03-19

## 2024-03-19 NOTE — ANESTHESIA PREPROCEDURE EVALUATION
Procedure:  PRE-OP ONLY    Fe deficient anemia - EGD and colonoscopy     TTE: LVEF 60%, RV wnl    ALBERTINA  on CPAP  Relevant Problems   HEMATOLOGY   (+) Nutritional anemia      PULMONARY   (+) Obstructive sleep apnea on CPAP   (+) Pleural effusion      Other   (+) Cervical lymphadenopathy   (+) Splenomegaly             Anesthesia Plan  ASA Score- 2     Anesthesia Type- IV sedation with anesthesia with ASA Monitors.         Additional Monitors:     Airway Plan:            Plan Factors-    Chart reviewed. EKG reviewed.  Existing labs reviewed. Patient summary reviewed.                  Induction-     Postoperative Plan-     Informed Consent-

## 2024-03-20 ENCOUNTER — HOSPITAL ENCOUNTER (OUTPATIENT)
Dept: GASTROENTEROLOGY | Facility: HOSPITAL | Age: 24
Setting detail: OUTPATIENT SURGERY
Discharge: HOME | End: 2024-03-20
Payer: COMMERCIAL

## 2024-03-20 ENCOUNTER — ANESTHESIA (OUTPATIENT)
Dept: GASTROENTEROLOGY | Facility: HOSPITAL | Age: 24
End: 2024-03-20

## 2024-03-20 ENCOUNTER — ANESTHESIA EVENT (OUTPATIENT)
Dept: GASTROENTEROLOGY | Facility: HOSPITAL | Age: 24
End: 2024-03-20

## 2024-03-20 VITALS
RESPIRATION RATE: 16 BRPM | DIASTOLIC BLOOD PRESSURE: 74 MMHG | HEART RATE: 78 BPM | OXYGEN SATURATION: 100 % | TEMPERATURE: 97 F | SYSTOLIC BLOOD PRESSURE: 108 MMHG

## 2024-03-20 DIAGNOSIS — E61.1 IRON DEFICIENCY: ICD-10-CM

## 2024-03-20 DIAGNOSIS — R59.0 LYMPHADENOPATHY, MESENTERIC: ICD-10-CM

## 2024-03-20 PROCEDURE — 88305 TISSUE EXAM BY PATHOLOGIST: CPT | Performed by: STUDENT IN AN ORGANIZED HEALTH CARE EDUCATION/TRAINING PROGRAM

## 2024-03-20 PROCEDURE — 88364 INSITU HYBRIDIZATION (FISH): CPT | Performed by: STUDENT IN AN ORGANIZED HEALTH CARE EDUCATION/TRAINING PROGRAM

## 2024-03-20 PROCEDURE — 88360 TUMOR IMMUNOHISTOCHEM/MANUAL: CPT | Performed by: STUDENT IN AN ORGANIZED HEALTH CARE EDUCATION/TRAINING PROGRAM

## 2024-03-20 PROCEDURE — 43239 EGD BIOPSY SINGLE/MULTIPLE: CPT | Performed by: INTERNAL MEDICINE

## 2024-03-20 PROCEDURE — 45380 COLONOSCOPY AND BIOPSY: CPT | Performed by: INTERNAL MEDICINE

## 2024-03-20 PROCEDURE — 88365 INSITU HYBRIDIZATION (FISH): CPT | Performed by: STUDENT IN AN ORGANIZED HEALTH CARE EDUCATION/TRAINING PROGRAM

## 2024-03-20 PROCEDURE — 88342 IMHCHEM/IMCYTCHM 1ST ANTB: CPT | Performed by: STUDENT IN AN ORGANIZED HEALTH CARE EDUCATION/TRAINING PROGRAM

## 2024-03-20 PROCEDURE — 88307 TISSUE EXAM BY PATHOLOGIST: CPT | Performed by: STUDENT IN AN ORGANIZED HEALTH CARE EDUCATION/TRAINING PROGRAM

## 2024-03-20 PROCEDURE — 88341 IMHCHEM/IMCYTCHM EA ADD ANTB: CPT | Performed by: STUDENT IN AN ORGANIZED HEALTH CARE EDUCATION/TRAINING PROGRAM

## 2024-03-20 RX ORDER — SODIUM CHLORIDE, SODIUM LACTATE, POTASSIUM CHLORIDE, CALCIUM CHLORIDE 600; 310; 30; 20 MG/100ML; MG/100ML; MG/100ML; MG/100ML
50 INJECTION, SOLUTION INTRAVENOUS CONTINUOUS
Status: DISCONTINUED | OUTPATIENT
Start: 2024-03-20 | End: 2024-03-24 | Stop reason: HOSPADM

## 2024-03-20 RX ORDER — PROPOFOL 10 MG/ML
INJECTION, EMULSION INTRAVENOUS AS NEEDED
Status: DISCONTINUED | OUTPATIENT
Start: 2024-03-20 | End: 2024-03-20

## 2024-03-20 RX ORDER — SIMETHICONE 40MG/0.6ML
SUSPENSION, DROPS(FINAL DOSAGE FORM)(ML) ORAL AS NEEDED
Status: COMPLETED | OUTPATIENT
Start: 2024-03-20 | End: 2024-03-20

## 2024-03-20 RX ADMIN — PROPOFOL 50 MG: 10 INJECTION, EMULSION INTRAVENOUS at 14:07

## 2024-03-20 RX ADMIN — PROPOFOL 100 MG: 10 INJECTION, EMULSION INTRAVENOUS at 14:12

## 2024-03-20 RX ADMIN — SODIUM CHLORIDE, SODIUM LACTATE, POTASSIUM CHLORIDE, AND CALCIUM CHLORIDE: .6; .31; .03; .02 INJECTION, SOLUTION INTRAVENOUS at 14:02

## 2024-03-20 RX ADMIN — PROPOFOL 50 MG: 10 INJECTION, EMULSION INTRAVENOUS at 14:16

## 2024-03-20 RX ADMIN — SIMETHICONE 40 MG: 20 EMULSION ORAL at 14:17

## 2024-03-20 RX ADMIN — SODIUM CHLORIDE, SODIUM LACTATE, POTASSIUM CHLORIDE, AND CALCIUM CHLORIDE: .6; .31; .03; .02 INJECTION, SOLUTION INTRAVENOUS at 13:51

## 2024-03-20 RX ADMIN — PROPOFOL 50 MG: 10 INJECTION, EMULSION INTRAVENOUS at 14:04

## 2024-03-20 RX ADMIN — PROPOFOL 50 MG: 10 INJECTION, EMULSION INTRAVENOUS at 14:21

## 2024-03-20 RX ADMIN — PROPOFOL 50 MG: 10 INJECTION, EMULSION INTRAVENOUS at 14:17

## 2024-03-20 RX ADMIN — PROPOFOL 150 MG: 10 INJECTION, EMULSION INTRAVENOUS at 14:03

## 2024-03-20 RX ADMIN — PROPOFOL 50 MG: 10 INJECTION, EMULSION INTRAVENOUS at 14:23

## 2024-03-20 NOTE — ANESTHESIA POSTPROCEDURE EVALUATION
Post-Op Assessment Note    CV Status:  Stable    Pain management: adequate       Mental Status:  Alert and awake   Hydration Status:  Euvolemic   PONV Controlled:  Controlled   Airway Patency:  Patent     Post Op Vitals Reviewed: Yes    No anethesia notable event occurred.    Staff: CRNA               BP   112/61   Temp      Pulse 97   Resp 16   SpO2 97

## 2024-03-20 NOTE — ANESTHESIA PREPROCEDURE EVALUATION
Procedure:  COLONOSCOPY  EGD  Fe deficient anemia - EGD and colonoscopy      TTE: LVEF 60%, RV wnl     ALBERTINA  on CPAP compliant    Denies the following: CP/SOB with exertion, asthma, COPD, ALBERTINA, stroke/TIA, seizure    Relevant Problems   HEMATOLOGY   (+) Nutritional anemia      PULMONARY   (+) Obstructive sleep apnea on CPAP   (+) Pleural effusion      Other   (+) Cervical lymphadenopathy   (+) Splenomegaly        Physical Exam    Airway    Mallampati score: II  TM Distance: >3 FB  Neck ROM: full     Dental   No notable dental hx     Cardiovascular      Pulmonary      Other Findings        Anesthesia Plan  ASA Score- 2     Anesthesia Type- IV sedation with anesthesia with ASA Monitors.         Additional Monitors:     Airway Plan:            Plan Factors-Exercise tolerance (METS): >4 METS.    Chart reviewed. EKG reviewed.  Existing labs reviewed. Patient summary reviewed.    Patient is not a current smoker.      Obstructive sleep apnea risk education given perioperatively.        Induction-     Postoperative Plan-     Informed Consent- Anesthetic plan and risks discussed with patient.  I personally reviewed this patient with the CRNA. Discussed and agreed on the Anesthesia Plan with the CRNA..

## 2024-03-20 NOTE — H&P
History and Physical -  [facility]  Turner Dye 23 y.o. male MRN: [id number]    HPI: Turner Dye is a 23 y.o. year old male who presents for EGD and colonoscopy for Hx of iron deficiency, elevated calprotectin and CRP.    REVIEW OF SYSTEMS: Per the HPI, and otherwise unremarkable.    Historical Information   Past Medical History:   Diagnosis Date    Anemia 23    Pleural effusion 23    Sleep apnea, obstructive 2020    uses Cpap[facility]     Past Surgical History:   Procedure Laterality Date    IR THORACENTESIS  2023     Social History   Social History     Substance and Sexual Activity   Alcohol Use Yes    Comment: Social use (3-5 drinks/month)     Social History     Substance and Sexual Activity   Drug Use Never     Social History     Tobacco Use   Smoking Status Never    Passive exposure: Never   Smokeless Tobacco Never     Family History   Problem Relation Age of Onset    Lymphoma Maternal Grandmother 70        non hodgkins    Breast cancer Maternal Grandmother 70    Cancer Maternal Grandmother         Alive, non-Hodgkin's lymphoma, breast cancer    Diabetes Maternal Grandmother         Alive    Diabetes Mother         Alive    Diabetes Father         Alive    Diabetes Maternal Grandfather                Meds/Allergies       Current Outpatient Medications:     polyethylene glycol (MiraLax) 17 GM/SCOOP powder    Allergies   Allergen Reactions    Augmentin [Amoxicillin-Pot Clavulanate] Rash       Objective   There were no vitals taken for this visit.    PHYSICAL EXAM  Gen: NAD  Head: NCAT  CV: RRR  CHEST: CTAB  ABD: soft, NT/ND  EXT: no edema    ASSESSMENT/PLAN:  This is a 23 y.o. year old male here for EGD and colonoscopy, and he is stable and optimized for his procedure.

## 2024-03-23 ENCOUNTER — HOSPITAL ENCOUNTER (OUTPATIENT)
Dept: INFUSION CENTER | Facility: HOSPITAL | Age: 24
Discharge: HOME | End: 2024-03-23
Attending: INTERNAL MEDICINE
Payer: COMMERCIAL

## 2024-03-23 VITALS
SYSTOLIC BLOOD PRESSURE: 145 MMHG | RESPIRATION RATE: 18 BRPM | TEMPERATURE: 97 F | HEART RATE: 73 BPM | DIASTOLIC BLOOD PRESSURE: 81 MMHG

## 2024-03-23 DIAGNOSIS — E61.1 IRON DEFICIENCY: Primary | ICD-10-CM

## 2024-03-23 PROCEDURE — 96365 THER/PROPH/DIAG IV INF INIT: CPT

## 2024-03-23 PROCEDURE — 96366 THER/PROPH/DIAG IV INF ADDON: CPT

## 2024-03-23 RX ORDER — SODIUM CHLORIDE 9 MG/ML
20 INJECTION, SOLUTION INTRAVENOUS ONCE
Status: COMPLETED | OUTPATIENT
Start: 2024-03-23 | End: 2024-03-23

## 2024-03-23 RX ORDER — SODIUM CHLORIDE 9 MG/ML
20 INJECTION, SOLUTION INTRAVENOUS ONCE
OUTPATIENT
Start: 2024-03-28

## 2024-03-23 RX ADMIN — SODIUM CHLORIDE 20 ML/HR: 0.9 INJECTION, SOLUTION INTRAVENOUS at 11:45

## 2024-03-23 RX ADMIN — IRON SUCROSE 300 MG: 20 INJECTION, SOLUTION INTRAVENOUS at 11:47

## 2024-03-23 NOTE — PROGRESS NOTES
Turner Dye  tolerated venofer well with no complications.      Turner Dye is aware of future appt on . 4-6-24 at 24    AVS  Declined by Turner Dye

## 2024-03-28 PROCEDURE — 88360 TUMOR IMMUNOHISTOCHEM/MANUAL: CPT | Performed by: STUDENT IN AN ORGANIZED HEALTH CARE EDUCATION/TRAINING PROGRAM

## 2024-03-28 PROCEDURE — 88364 INSITU HYBRIDIZATION (FISH): CPT | Performed by: STUDENT IN AN ORGANIZED HEALTH CARE EDUCATION/TRAINING PROGRAM

## 2024-03-28 PROCEDURE — 88341 IMHCHEM/IMCYTCHM EA ADD ANTB: CPT | Performed by: STUDENT IN AN ORGANIZED HEALTH CARE EDUCATION/TRAINING PROGRAM

## 2024-03-28 PROCEDURE — 88305 TISSUE EXAM BY PATHOLOGIST: CPT | Performed by: STUDENT IN AN ORGANIZED HEALTH CARE EDUCATION/TRAINING PROGRAM

## 2024-03-28 PROCEDURE — 88365 INSITU HYBRIDIZATION (FISH): CPT | Performed by: STUDENT IN AN ORGANIZED HEALTH CARE EDUCATION/TRAINING PROGRAM

## 2024-03-28 PROCEDURE — 88342 IMHCHEM/IMCYTCHM 1ST ANTB: CPT | Performed by: STUDENT IN AN ORGANIZED HEALTH CARE EDUCATION/TRAINING PROGRAM

## 2024-04-02 DIAGNOSIS — E61.1 IRON DEFICIENCY: Primary | ICD-10-CM

## 2024-04-02 RX ORDER — SODIUM CHLORIDE 9 MG/ML
20 INJECTION, SOLUTION INTRAVENOUS ONCE
Status: CANCELLED | OUTPATIENT
Start: 2024-04-06

## 2024-04-06 ENCOUNTER — HOSPITAL ENCOUNTER (OUTPATIENT)
Dept: INFUSION CENTER | Facility: HOSPITAL | Age: 24
Discharge: HOME | End: 2024-04-06
Payer: COMMERCIAL

## 2024-04-06 VITALS
HEART RATE: 79 BPM | RESPIRATION RATE: 20 BRPM | SYSTOLIC BLOOD PRESSURE: 125 MMHG | TEMPERATURE: 96 F | DIASTOLIC BLOOD PRESSURE: 78 MMHG

## 2024-04-06 DIAGNOSIS — E61.1 IRON DEFICIENCY: Primary | ICD-10-CM

## 2024-04-06 RX ORDER — SODIUM CHLORIDE 9 MG/ML
20 INJECTION, SOLUTION INTRAVENOUS ONCE
Status: COMPLETED | OUTPATIENT
Start: 2024-04-06 | End: 2024-04-06

## 2024-04-06 RX ORDER — SODIUM CHLORIDE 9 MG/ML
20 INJECTION, SOLUTION INTRAVENOUS ONCE
Status: CANCELLED | OUTPATIENT
Start: 2024-04-13

## 2024-04-06 RX ADMIN — SODIUM CHLORIDE 20 ML/HR: 0.9 INJECTION, SOLUTION INTRAVENOUS at 11:02

## 2024-04-06 RX ADMIN — IRON SUCROSE 300 MG: 20 INJECTION, SOLUTION INTRAVENOUS at 11:02

## 2024-04-06 NOTE — PROGRESS NOTES
Turner Dye  tolerated treatment well with no complications.      Turner Dye is aware that he does not have any future appointments with the infusion center.     AVS printed and given to Turner Dye:  No (Declined by Turner Dye)

## 2024-04-10 NOTE — NURSING NOTE
Call placed to patient to discuss upcoming appointment at [facility] radiology department and complete consultation with patient. Patient is having a lymph node biopsy utilizing  US guidance. Reviewed patient's allergies, no current anticoagulant medication present per patient, also discussed the pre and post procedure expectations.  Reminded patient of location and time expected for procedure, Patient expressed understanding by verbalizing and repeating instructions.   Patient questioned if his beard that is over the location of the lymph node would be shaved off, I questioned US technologist Marrow and according to him he has seen it go both ways, it depends on the provider performing the procedure. Informed that same information to the patient and he was understanding and appreciative for response.

## 2024-04-22 ENCOUNTER — HOSPITAL ENCOUNTER (OUTPATIENT)
Dept: RADIOLOGY | Facility: HOSPITAL | Age: 24
Discharge: HOME | End: 2024-04-22
Payer: COMMERCIAL

## 2024-04-22 DIAGNOSIS — R59.1 LYMPHADENOPATHY OF HEAD AND NECK: ICD-10-CM

## 2024-04-22 PROCEDURE — 87070 CULTURE OTHR SPECIMN AEROBIC: CPT

## 2024-04-22 PROCEDURE — 87205 SMEAR GRAM STAIN: CPT

## 2024-04-22 PROCEDURE — 10005 FNA BX W/US GDN 1ST LES: CPT

## 2024-04-22 PROCEDURE — 88173 CYTOPATH EVAL FNA REPORT: CPT | Performed by: STUDENT IN AN ORGANIZED HEALTH CARE EDUCATION/TRAINING PROGRAM

## 2024-04-22 PROCEDURE — 88185 FLOWCYTOMETRY/TC ADD-ON: CPT

## 2024-04-22 PROCEDURE — 87075 CULTR BACTERIA EXCEPT BLOOD: CPT

## 2024-04-22 PROCEDURE — 88184 FLOWCYTOMETRY/ TC 1 MARKER: CPT

## 2024-04-22 PROCEDURE — 88172 CYTP DX EVAL FNA 1ST EA SITE: CPT | Performed by: STUDENT IN AN ORGANIZED HEALTH CARE EDUCATION/TRAINING PROGRAM

## 2024-04-22 RX ORDER — LIDOCAINE HYDROCHLORIDE 10 MG/ML
2 INJECTION, SOLUTION EPIDURAL; INFILTRATION; INTRACAUDAL; PERINEURAL ONCE
Status: COMPLETED | OUTPATIENT
Start: 2024-04-22 | End: 2024-04-22

## 2024-04-22 RX ADMIN — LIDOCAINE HYDROCHLORIDE 2 ML: 10 INJECTION, SOLUTION EPIDURAL; INFILTRATION; INTRACAUDAL; PERINEURAL at 09:37

## 2024-04-25 LAB
BACTERIA SPEC ANAEROBE CULT: NO GROWTH
BACTERIA TISS AEROBE CULT: NO GROWTH
GRAM STN SPEC: NORMAL
GRAM STN SPEC: NORMAL

## 2024-04-25 PROCEDURE — 88173 CYTOPATH EVAL FNA REPORT: CPT | Performed by: STUDENT IN AN ORGANIZED HEALTH CARE EDUCATION/TRAINING PROGRAM

## 2024-04-25 PROCEDURE — 88172 CYTP DX EVAL FNA 1ST EA SITE: CPT | Performed by: STUDENT IN AN ORGANIZED HEALTH CARE EDUCATION/TRAINING PROGRAM

## 2024-04-26 ENCOUNTER — TELEPHONE (OUTPATIENT)
Dept: PULMONOLOGY | Facility: CLINIC | Age: 24
End: 2024-04-26

## 2024-04-26 LAB — SCAN RESULT: NORMAL

## 2024-04-26 NOTE — TELEPHONE ENCOUNTER
CALLED PATIENT TO SCHEDULE THE 6M FU FROM RECALL LIST AND PATIENT STATED HE IS FEELING BETTER AND HAS NO NEED TO COME IN. TAKING PATIENT OFF RECALL LIST

## 2024-05-31 ENCOUNTER — TELEPHONE (OUTPATIENT)
Dept: HEMATOLOGY ONCOLOGY | Facility: CLINIC | Age: 24
End: 2024-05-31

## 2024-05-31 NOTE — TELEPHONE ENCOUNTER
Left VM for patient to let him know that [facility]ONARDO Acharya does not have any specific providers in mind in [place] but does recommend [facility]. I left my callback number if the patient needs anything else.

## 2024-05-31 NOTE — TELEPHONE ENCOUNTER
I called [facility] regarding an appointment that they have scheduled with LEONARDO Quick scheduled on  6/17/24           Appointment Change  Cancel, Reschedule, Change to Virtual      Who are you speaking with? Patient   If it is not the patient, is the caller listed on the communication consent form? N/A   Which provider is the appointment scheduled with? LEONARDO Quick   When was the original appointment scheduled?    Please list date and time 6/17/24 2:00pm   At which location is the appointment scheduled to take place? [place]   Was the appointment rescheduled?     Was the appointment changed from an in person visit to a virtual visit?    If so, please list the details of the change. Patient moved to [place] and is unable to travel to [place] for follow up appointment.  Patient would like to speak with Patrizia Gould to see if she has any recommendations of another provider patient can see in [place].  Patient would like a call back to discuss. [phone]   What is the reason for the appointment change? Provider out of office       Was [facility] transport scheduled? No   Does [facility] transport need to be scheduled for the new visit (if applicable) No   Does the patient need an infusion appointment rescheduled? No   Does the patient have an upcoming infusion appointment scheduled? If so, when? No   Is the patient undergoing chemotherapy? No   For appointments cancelled with less than 24 hours:  Was the no-show policy reviewed? N/A

## 2024-06-10 ENCOUNTER — HOSPITAL ENCOUNTER (OUTPATIENT)
Dept: RADIOLOGY | Facility: HOSPITAL | Age: 24
Discharge: HOME | End: 2024-06-10
Payer: COMMERCIAL

## 2024-06-10 DIAGNOSIS — R59.0 LYMPHADENOPATHY, MESENTERIC: ICD-10-CM

## 2024-06-10 DIAGNOSIS — R16.1 SPLENOMEGALY: ICD-10-CM

## 2024-06-10 PROCEDURE — 74177 CT ABD & PELVIS W/CONTRAST: CPT

## 2024-06-10 PROCEDURE — G1004 CDSM NDSC: HCPCS

## 2024-06-10 PROCEDURE — 71260 CT THORAX DX C+: CPT

## 2024-06-10 RX ADMIN — IOHEXOL 100 ML: 350 INJECTION, SOLUTION INTRAVENOUS at 13:10

## 2024-06-19 ENCOUNTER — TELEPHONE (OUTPATIENT)
Dept: HEMATOLOGY ONCOLOGY | Facility: CLINIC | Age: 24
End: 2024-06-19

## 2024-06-19 NOTE — TELEPHONE ENCOUNTER
Spoke with patient to review his CT scan.  Informed him that the CT scan is stable.  There is a pulmonary nodule that the radiologist recommends 12-month follow-up on.  The mesenteric adenopathy is stable and there is no new or enlarging lymphadenopathy.    Patient is a [place] therefore, will be establishing care with a hematologist there.  Advised him to contact us for any additional questions/concerns.

## 2025-05-16 ENCOUNTER — APPOINTMENT (OUTPATIENT)
Age: 25
End: 2025-05-16